# Patient Record
Sex: FEMALE | Race: WHITE | NOT HISPANIC OR LATINO | Employment: OTHER | URBAN - METROPOLITAN AREA
[De-identification: names, ages, dates, MRNs, and addresses within clinical notes are randomized per-mention and may not be internally consistent; named-entity substitution may affect disease eponyms.]

---

## 2019-01-09 ENCOUNTER — OFFICE VISIT (OUTPATIENT)
Dept: URGENT CARE | Facility: CLINIC | Age: 59
End: 2019-01-09
Payer: COMMERCIAL

## 2019-01-09 ENCOUNTER — HOSPITAL ENCOUNTER (INPATIENT)
Facility: OTHER | Age: 59
LOS: 1 days | Discharge: HOME | DRG: 066 | End: 2019-01-10
Attending: EMERGENCY MEDICINE | Admitting: HOSPITALIST
Payer: COMMERCIAL

## 2019-01-09 VITALS
BODY MASS INDEX: 31.01 KG/M2 | RESPIRATION RATE: 18 BRPM | HEIGHT: 63 IN | DIASTOLIC BLOOD PRESSURE: 86 MMHG | WEIGHT: 175 LBS | TEMPERATURE: 98 F | SYSTOLIC BLOOD PRESSURE: 127 MMHG | OXYGEN SATURATION: 99 % | HEART RATE: 67 BPM

## 2019-01-09 DIAGNOSIS — R42 DIZZINESS: Primary | ICD-10-CM

## 2019-01-09 DIAGNOSIS — M62.81 RIGHT-SIDED MUSCLE WEAKNESS: ICD-10-CM

## 2019-01-09 DIAGNOSIS — I63.512 ACUTE ISCHEMIC CEREBROVASCULAR ACCIDENT (CVA) INVOLVING LEFT MIDDLE CEREBRAL ARTERY TERRITORY: Primary | ICD-10-CM

## 2019-01-09 DIAGNOSIS — I63.9 STROKE: ICD-10-CM

## 2019-01-09 DIAGNOSIS — R47.81 SLURRED SPEECH: ICD-10-CM

## 2019-01-09 PROBLEM — I10 ESSENTIAL HYPERTENSION: Status: ACTIVE | Noted: 2019-01-09

## 2019-01-09 LAB
ALBUMIN SERPL BCP-MCNC: 3.9 G/DL
ALP SERPL-CCNC: 67 U/L
ALT SERPL W/O P-5'-P-CCNC: 18 U/L
AMPHET+METHAMPHET UR QL: NEGATIVE
ANION GAP SERPL CALC-SCNC: 10 MMOL/L
AORTIC ROOT ANNULUS: 3.03 CM
AORTIC VALVE CUSP SEPERATION: 1.99 CM
ASCENDING AORTA: 2.94 CM
AST SERPL-CCNC: 21 U/L
AV INDEX (PROSTH): 0.67
AV MEAN GRADIENT: 2.65 MMHG
AV PEAK GRADIENT: 5.57 MMHG
AV VALVE AREA: 2.14 CM2
BARBITURATES UR QL SCN>200 NG/ML: NEGATIVE
BASOPHILS # BLD AUTO: 0.04 K/UL
BASOPHILS NFR BLD: 0.7 %
BENZODIAZ UR QL SCN>200 NG/ML: NEGATIVE
BILIRUB SERPL-MCNC: 0.6 MG/DL
BILIRUB UR QL STRIP: NEGATIVE
BSA FOR ECHO PROCEDURE: 1.95 M2
BUN SERPL-MCNC: 11 MG/DL
BZE UR QL SCN: NEGATIVE
CALCIUM SERPL-MCNC: 10.5 MG/DL
CANNABINOIDS UR QL SCN: NEGATIVE
CHLORIDE SERPL-SCNC: 108 MMOL/L
CHOLEST SERPL-MCNC: 219 MG/DL
CHOLEST/HDLC SERPL: 2.6 {RATIO}
CLARITY UR: CLEAR
CO2 SERPL-SCNC: 22 MMOL/L
COLOR UR: YELLOW
CREAT SERPL-MCNC: 0.8 MG/DL
CREAT UR-MCNC: 17.5 MG/DL
CV ECHO LV RWT: 0.4 CM
DIFFERENTIAL METHOD: ABNORMAL
DOP CALC AO PEAK VEL: 1.18 M/S
DOP CALC AO VTI: 28.81 CM
DOP CALC LVOT AREA: 3.2 CM2
DOP CALC LVOT DIAMETER: 2.02 CM
DOP CALC LVOT STROKE VOLUME: 61.53 CM3
DOP CALCLVOT PEAK VEL VTI: 19.21 CM
E WAVE DECELERATION TIME: 244.63 MSEC
E/A RATIO: 0.9
E/E' RATIO: 8.95
ECHO LV POSTERIOR WALL: 0.87 CM (ref 0.6–1.1)
EOSINOPHIL # BLD AUTO: 0.2 K/UL
EOSINOPHIL NFR BLD: 2.9 %
ERYTHROCYTE [DISTWIDTH] IN BLOOD BY AUTOMATED COUNT: 13.3 %
EST. GFR  (AFRICAN AMERICAN): >60 ML/MIN/1.73 M^2
EST. GFR  (NON AFRICAN AMERICAN): >60 ML/MIN/1.73 M^2
ESTIMATED AVG GLUCOSE: 111 MG/DL
ETHANOL UR-MCNC: <10 MG/DL
FRACTIONAL SHORTENING: 38 % (ref 28–44)
GLUCOSE SERPL-MCNC: 100 MG/DL
GLUCOSE UR QL STRIP: NEGATIVE
HBA1C MFR BLD HPLC: 5.5 %
HCT VFR BLD AUTO: 38.2 %
HDLC SERPL-MCNC: 84 MG/DL
HDLC SERPL: 38.4 %
HGB BLD-MCNC: 13.2 G/DL
HGB UR QL STRIP: NEGATIVE
INR PPP: 0.9
INTERVENTRICULAR SEPTUM: 0.87 CM (ref 0.6–1.1)
IVRT: 0.13 MSEC
KETONES UR QL STRIP: NEGATIVE
LA MAJOR: 5.02 CM
LA MINOR: 5.36 CM
LA WIDTH: 3.65 CM
LDLC SERPL CALC-MCNC: 114.6 MG/DL
LEFT ATRIUM SIZE: 3.42 CM
LEFT ATRIUM VOLUME INDEX: 29.2 ML/M2
LEFT ATRIUM VOLUME: 55.01 CM3
LEFT INTERNAL DIMENSION IN SYSTOLE: 2.67 CM (ref 2.1–4)
LEFT VENTRICLE DIASTOLIC VOLUME INDEX: 45.16 ML/M2
LEFT VENTRICLE DIASTOLIC VOLUME: 85.07 ML
LEFT VENTRICLE MASS INDEX: 63.5 G/M2
LEFT VENTRICLE SYSTOLIC VOLUME INDEX: 14 ML/M2
LEFT VENTRICLE SYSTOLIC VOLUME: 26.37 ML
LEFT VENTRICULAR INTERNAL DIMENSION IN DIASTOLE: 4.34 CM (ref 3.5–6)
LEFT VENTRICULAR MASS: 119.58 G
LEUKOCYTE ESTERASE UR QL STRIP: NEGATIVE
LV LATERAL E/E' RATIO: 7.73
LV SEPTAL E/E' RATIO: 10.63
LYMPHOCYTES # BLD AUTO: 1.9 K/UL
LYMPHOCYTES NFR BLD: 32 %
MCH RBC QN AUTO: 31.4 PG
MCHC RBC AUTO-ENTMCNC: 34.6 G/DL
MCV RBC AUTO: 91 FL
METHADONE UR QL SCN>300 NG/ML: NEGATIVE
MONOCYTES # BLD AUTO: 0.5 K/UL
MONOCYTES NFR BLD: 8.2 %
MV PEAK A VEL: 0.94 M/S
MV PEAK E VEL: 0.85 M/S
NEUTROPHILS # BLD AUTO: 3.3 K/UL
NEUTROPHILS NFR BLD: 56 %
NITRITE UR QL STRIP: NEGATIVE
NONHDLC SERPL-MCNC: 135 MG/DL
OPIATES UR QL SCN: NEGATIVE
PCP UR QL SCN>25 NG/ML: NEGATIVE
PH UR STRIP: 8 [PH] (ref 5–8)
PISA TR MAX VEL: 2.36 M/S
PLATELET # BLD AUTO: 265 K/UL
PMV BLD AUTO: 10.1 FL
POCT GLUCOSE: 95 MG/DL (ref 70–110)
POTASSIUM SERPL-SCNC: 3.9 MMOL/L
PROT SERPL-MCNC: 6.9 G/DL
PROT UR QL STRIP: NEGATIVE
PROTHROMBIN TIME: 9.9 SEC
PULM VEIN S/D RATIO: 1.83
PV PEAK D VEL: 0.3 M/S
PV PEAK S VEL: 0.55 M/S
PV PEAK VELOCITY: 0.91 CM/S
RA MAJOR: 4.63 CM
RA WIDTH: 3.3 CM
RBC # BLD AUTO: 4.21 M/UL
RIGHT VENTRICULAR END-DIASTOLIC DIMENSION: 3.48 CM
RV TISSUE DOPPLER FREE WALL SYSTOLIC VELOCITY 1 (APICAL 4 CHAMBER VIEW): 12.67 M/S
SINUS: 2.93 CM
SODIUM SERPL-SCNC: 140 MMOL/L
SP GR UR STRIP: 1.01 (ref 1–1.03)
STJ: 2.96 CM
TDI LATERAL: 0.11
TDI SEPTAL: 0.08
TDI: 0.1
TOXICOLOGY INFORMATION: NORMAL
TR MAX PG: 22.28 MMHG
TRICUSPID ANNULAR PLANE SYSTOLIC EXCURSION: 2.23 CM
TRIGL SERPL-MCNC: 102 MG/DL
TROPONIN I SERPL DL<=0.01 NG/ML-MCNC: 0.01 NG/ML
TSH SERPL DL<=0.005 MIU/L-ACNC: 0.95 UIU/ML
URN SPEC COLLECT METH UR: NORMAL
UROBILINOGEN UR STRIP-ACNC: NEGATIVE EU/DL
WBC # BLD AUTO: 5.88 K/UL

## 2019-01-09 PROCEDURE — 94761 N-INVAS EAR/PLS OXIMETRY MLT: CPT

## 2019-01-09 PROCEDURE — 99223 PR INITIAL HOSPITAL CARE,LEVL III: ICD-10-PCS | Mod: ,,, | Performed by: PSYCHIATRY & NEUROLOGY

## 2019-01-09 PROCEDURE — 99205 OFFICE O/P NEW HI 60 MIN: CPT | Mod: S$GLB,,, | Performed by: NURSE PRACTITIONER

## 2019-01-09 PROCEDURE — 83036 HEMOGLOBIN GLYCOSYLATED A1C: CPT

## 2019-01-09 PROCEDURE — 84484 ASSAY OF TROPONIN QUANT: CPT

## 2019-01-09 PROCEDURE — 99205 PR OFFICE/OUTPT VISIT, NEW, LEVL V, 60-74 MIN: ICD-10-PCS | Mod: S$GLB,,, | Performed by: NURSE PRACTITIONER

## 2019-01-09 PROCEDURE — 81003 URINALYSIS AUTO W/O SCOPE: CPT

## 2019-01-09 PROCEDURE — 84443 ASSAY THYROID STIM HORMONE: CPT

## 2019-01-09 PROCEDURE — 3008F PR BODY MASS INDEX (BMI) DOCUMENTED: ICD-10-PCS | Mod: CPTII,S$GLB,, | Performed by: NURSE PRACTITIONER

## 2019-01-09 PROCEDURE — 25000003 PHARM REV CODE 250: Performed by: EMERGENCY MEDICINE

## 2019-01-09 PROCEDURE — A4216 STERILE WATER/SALINE, 10 ML: HCPCS | Performed by: HOSPITALIST

## 2019-01-09 PROCEDURE — A9585 GADOBUTROL INJECTION: HCPCS | Performed by: HOSPITALIST

## 2019-01-09 PROCEDURE — 25000003 PHARM REV CODE 250: Performed by: HOSPITALIST

## 2019-01-09 PROCEDURE — 93010 EKG 12-LEAD: ICD-10-PCS | Mod: ,,, | Performed by: INTERNAL MEDICINE

## 2019-01-09 PROCEDURE — 99223 PR INITIAL HOSPITAL CARE,LEVL III: ICD-10-PCS | Mod: ,,, | Performed by: HOSPITALIST

## 2019-01-09 PROCEDURE — 93005 ELECTROCARDIOGRAM TRACING: CPT

## 2019-01-09 PROCEDURE — 3008F BODY MASS INDEX DOCD: CPT | Mod: CPTII,S$GLB,, | Performed by: NURSE PRACTITIONER

## 2019-01-09 PROCEDURE — 25500020 PHARM REV CODE 255: Performed by: HOSPITALIST

## 2019-01-09 PROCEDURE — 80053 COMPREHEN METABOLIC PANEL: CPT

## 2019-01-09 PROCEDURE — 99285 EMERGENCY DEPT VISIT HI MDM: CPT | Mod: 25

## 2019-01-09 PROCEDURE — 80307 DRUG TEST PRSMV CHEM ANLYZR: CPT

## 2019-01-09 PROCEDURE — 63600175 PHARM REV CODE 636 W HCPCS: Performed by: HOSPITALIST

## 2019-01-09 PROCEDURE — 82962 GLUCOSE BLOOD TEST: CPT

## 2019-01-09 PROCEDURE — 11000001 HC ACUTE MED/SURG PRIVATE ROOM

## 2019-01-09 PROCEDURE — 99223 1ST HOSP IP/OBS HIGH 75: CPT | Mod: ,,, | Performed by: HOSPITALIST

## 2019-01-09 PROCEDURE — 92610 EVALUATE SWALLOWING FUNCTION: CPT

## 2019-01-09 PROCEDURE — 85610 PROTHROMBIN TIME: CPT

## 2019-01-09 PROCEDURE — 80061 LIPID PANEL: CPT

## 2019-01-09 PROCEDURE — 99223 1ST HOSP IP/OBS HIGH 75: CPT | Mod: ,,, | Performed by: PSYCHIATRY & NEUROLOGY

## 2019-01-09 PROCEDURE — 85025 COMPLETE CBC W/AUTO DIFF WBC: CPT

## 2019-01-09 PROCEDURE — 93010 ELECTROCARDIOGRAM REPORT: CPT | Mod: ,,, | Performed by: INTERNAL MEDICINE

## 2019-01-09 RX ORDER — ATORVASTATIN CALCIUM 20 MG/1
40 TABLET, FILM COATED ORAL ONCE
Status: COMPLETED | OUTPATIENT
Start: 2019-01-09 | End: 2019-01-09

## 2019-01-09 RX ORDER — AMLODIPINE BESYLATE 2.5 MG/1
TABLET ORAL
Status: ON HOLD | COMMUNITY
Start: 2018-12-17 | End: 2019-01-10 | Stop reason: SDUPTHER

## 2019-01-09 RX ORDER — IRBESARTAN 300 MG/1
300 TABLET ORAL DAILY
COMMUNITY
Start: 2018-12-17

## 2019-01-09 RX ORDER — LORAZEPAM 0.5 MG/1
0.5 TABLET ORAL ONCE
Status: DISCONTINUED | OUTPATIENT
Start: 2019-01-09 | End: 2019-01-10 | Stop reason: HOSPADM

## 2019-01-09 RX ORDER — SODIUM CHLORIDE 0.9 % (FLUSH) 0.9 %
5 SYRINGE (ML) INJECTION
Status: DISCONTINUED | OUTPATIENT
Start: 2019-01-09 | End: 2019-01-10 | Stop reason: HOSPADM

## 2019-01-09 RX ORDER — ASPIRIN 81 MG/1
81 TABLET ORAL DAILY
Status: DISCONTINUED | OUTPATIENT
Start: 2019-01-10 | End: 2019-01-10 | Stop reason: HOSPADM

## 2019-01-09 RX ORDER — LABETALOL HCL 20 MG/4 ML
10 SYRINGE (ML) INTRAVENOUS
Status: DISCONTINUED | OUTPATIENT
Start: 2019-01-09 | End: 2019-01-10 | Stop reason: HOSPADM

## 2019-01-09 RX ORDER — ASPIRIN 325 MG
325 TABLET ORAL ONCE
Status: COMPLETED | OUTPATIENT
Start: 2019-01-09 | End: 2019-01-09

## 2019-01-09 RX ORDER — SODIUM CHLORIDE 0.9 % (FLUSH) 0.9 %
3 SYRINGE (ML) INJECTION EVERY 8 HOURS
Status: DISCONTINUED | OUTPATIENT
Start: 2019-01-09 | End: 2019-01-10 | Stop reason: HOSPADM

## 2019-01-09 RX ORDER — HYDROCHLOROTHIAZIDE 25 MG/1
25 TABLET ORAL DAILY
Status: ON HOLD | COMMUNITY
Start: 2018-12-17 | End: 2019-01-10 | Stop reason: HOSPADM

## 2019-01-09 RX ORDER — ENOXAPARIN SODIUM 100 MG/ML
40 INJECTION SUBCUTANEOUS EVERY 24 HOURS
Status: DISCONTINUED | OUTPATIENT
Start: 2019-01-09 | End: 2019-01-10 | Stop reason: HOSPADM

## 2019-01-09 RX ORDER — IRBESARTAN AND HYDROCHLOROTHIAZIDE 150; 12.5 MG/1; MG/1
TABLET, FILM COATED ORAL
Status: ON HOLD | COMMUNITY
Start: 2018-12-05 | End: 2019-01-10 | Stop reason: SDUPTHER

## 2019-01-09 RX ORDER — GADOBUTROL 604.72 MG/ML
10 INJECTION INTRAVENOUS
Status: COMPLETED | OUTPATIENT
Start: 2019-01-09 | End: 2019-01-09

## 2019-01-09 RX ORDER — ONDANSETRON 8 MG/1
8 TABLET, ORALLY DISINTEGRATING ORAL EVERY 8 HOURS PRN
Status: DISCONTINUED | OUTPATIENT
Start: 2019-01-09 | End: 2019-01-10 | Stop reason: HOSPADM

## 2019-01-09 RX ORDER — ATORVASTATIN CALCIUM 20 MG/1
40 TABLET, FILM COATED ORAL DAILY
Status: DISCONTINUED | OUTPATIENT
Start: 2019-01-10 | End: 2019-01-10 | Stop reason: HOSPADM

## 2019-01-09 RX ADMIN — ATORVASTATIN CALCIUM 40 MG: 20 TABLET, FILM COATED ORAL at 12:01

## 2019-01-09 RX ADMIN — ENOXAPARIN SODIUM 40 MG: 100 INJECTION SUBCUTANEOUS at 05:01

## 2019-01-09 RX ADMIN — Medication 3 ML: at 08:01

## 2019-01-09 RX ADMIN — ASPIRIN 325 MG ORAL TABLET 325 MG: 325 PILL ORAL at 12:01

## 2019-01-09 RX ADMIN — GADOBUTROL 10 ML: 604.72 INJECTION INTRAVENOUS at 02:01

## 2019-01-09 NOTE — PT/OT/SLP PROGRESS
Physical Therapy      Patient Name:  Seble Puente   MRN:  [id number]    Patient not seen today secondary to Unavailable. Pt off the floor at 1339, at MRI at 1430, and at electrocardiogram at 1545. Will follow-up tomorrow, 1/10.    Elizabeth Dwyer, PT

## 2019-01-09 NOTE — HPI
This is a 58-year-old female who was referred for evaluation of speech difficult.  In addition, she also had difficulty ambulating leaning to the right.  Symptoms started the night prior when she felt nauseous and had diarrhea relating it to some bad food that she may have had.  Subsequently her partner noted that she was slurring her speech and started to speak [place] citizen.  Subsequently today she was seen at the urgent care and was referred immediately to the ED for evaluation of a stroke syndrome.  She did report that her symptoms appear to be slowly improving and she denies any headaches.  She had some blurring of vision noted yesterday but this has gradually resolved.  A CT scan of the head done in the emergency room was unremarkable.  She subsequently underwent an MRI scan of the brain and an MRA of the brain and neck done.  This showed evidence of an acute lacunar infarct involving the left ventral thalamus.  No hemorrhage or mass effect.  No significant vascular abnormality.  Remote lacunar infarct involving the right ventral thalamus and suggestion of remote punctate infarcts in the cerebellar hemispheres.  Mild findings of chronic microvascular ischemic disease.

## 2019-01-09 NOTE — SUBJECTIVE & OBJECTIVE
Past Medical History:   Diagnosis Date    Hypertension        Past Surgical History:   Procedure Laterality Date    HYSTERECTOMY      TONSILLECTOMY         Review of patient's allergies indicates:  No Known Allergies    Current Neurological Medications: aspirin    No current facility-administered medications on file prior to encounter.      Current Outpatient Medications on File Prior to Encounter   Medication Sig    amLODIPine (NORVASC) 2.5 MG tablet     hydroCHLOROthiazide (HYDRODIURIL) 25 MG tablet Take 25 mg by mouth once daily.    irbesartan (AVAPRO) 300 MG tablet Take 300 mg by mouth once daily.    irbesartan-hydrochlorothiazide (AVALIDE) 150-12.5 mg per tablet      Family History     Problem Relation (Age of Onset)    Stroke Father        Tobacco Use    Smoking status: Never Smoker    Smokeless tobacco: Never Used   Substance and Sexual Activity    Alcohol use: Yes    Drug use: No    Sexual activity: Not on file     Review of Systems   Constitutional: Negative for activity change, chills, diaphoresis and fatigue.   HENT: Negative for congestion, drooling and hearing loss.    Eyes: Negative for discharge.   Respiratory: Negative for apnea, chest tightness, shortness of breath and wheezing.    Cardiovascular: Negative for palpitations and leg swelling.   Gastrointestinal: Negative for abdominal distention, abdominal pain, constipation and diarrhea.   Musculoskeletal: Positive for gait problem. Negative for arthralgias.   Skin: Negative for rash.   Neurological: Positive for speech difficulty and weakness. Negative for seizures, light-headedness and numbness.   Hematological: Negative for adenopathy.   Psychiatric/Behavioral: Negative for agitation and behavioral problems. The patient is nervous/anxious.      Objective:     Vital Signs (Most Recent):  Temp: 97.8 °F (36.6 °C) (01/09/19 1648)  Pulse: (!) 58 (01/09/19 1648)  Resp: (!) 22 (01/09/19 1648)  BP: (!) 139/92 (01/09/19 1648)  SpO2: 98 %  (01/09/19 1648) Vital Signs (24h Range):  Temp:  [97.5 °F (36.4 °C)-97.8 °F (36.6 °C)] 97.8 °F (36.6 °C)  Pulse:  [56-67] 58  Resp:  [16-22] 22  SpO2:  [95 %-99 %] 98 %  BP: (122-174)/(77-92) 139/92     Weight: 85.6 kg (188 lb 11.4 oz)  Body mass index is 34.52 kg/m².    Physical Exam   Constitutional: She appears well-developed and well-nourished.   HENT:   Head: Normocephalic and atraumatic.   Right Ear: Hearing normal.   Left Ear: Hearing normal.   Eyes: EOM are normal. Pupils are equal, round, and reactive to light.   Fundus examination showed sharp disc margins.   Neck: Normal range of motion. Neck supple. Carotid bruit is not present.   Cardiovascular: Normal rate, regular rhythm and normal heart sounds.   Neurological: She is alert. She has normal reflexes. She displays atrophy (Minimal clumsiness of the right upper extremity). No cranial nerve deficit (Visual fields at bedside testing essentially normal.  No facial asymmetry noted with facial movements and sensory exam being normal/symmetrical.  Corneals/gag reflexes normal.  Tongue & palate movements normal.  Shoulder shrug was normal.) or sensory deficit (Primary sensations are intact). She exhibits normal muscle tone. Coordination and gait (Patient is able to ambulate without assistance..  Minimal difficulty on the right) normal. She displays no Babinski's sign on the right side. She displays no Babinski's sign on the left side.   Mental status examination: Patient is fully oriented and able to give an adequate history.  Recall of recent and past information is good.  Immediate recall is normal.  A back space Judgment and insight is normal.  Language functions are intact with no evidence of aphasia or dysarthria except for minimal hesitancy.  Comprehension is unimpaired.  Affect is appropriate, mood was even.  No thought disorder is noted.   Vitals reviewed.      NEUROLOGICAL EXAMINATION:     CRANIAL NERVES     CN III, IV, VI   Pupils are equal, round,  and reactive to light.  Extraocular motions are normal.       Significant Labs: All pertinent lab results from the past 24 hours have been reviewed.    Significant Imaging: I have reviewed all pertinent imaging results/findings within the past 24 hours.

## 2019-01-09 NOTE — PROGRESS NOTES
"Subjective:       Patient ID: Seble Puente is a 58 y.o. female.    Vitals:  height is 5' 3" (1.6 m) and weight is 79.4 kg (175 lb). Her temperature is 97.8 °F (36.6 °C). Her blood pressure is 127/86 and her pulse is 67. Her respiration is 18 and oxygen saturation is 99%.     Chief Complaint: Blurred Vision; Dizziness; and Neck Pain    Pt from [facility], here on vacation, after eating yesterday about an hour later felt unusual. Began having lightheadedness/dizziness, vision change double, uncoordinated, neck pain, and speech difficulty. Pt takes hypertensive medication but unsure of the name. Pt is currently seeing her dr back home for neck pain and has a CT scheduled for Monday. Pt has never felt like this before. Father has hx of stroke and is diseased from it. Also says she is having a hard time with remembering parts of yesterday. Denies chest pain or rapid heart rate. No diarrhea, vomiting, numbness, or headache.      Dizziness:   Chronicity:  New  Onset:  Yesterday  Progression since onset:  Unchanged  Frequency:  Constantly  Dizziness characteristics:  Off-balance, walking on uneven surface and lightheaded/impending faint   Associated symptoms: light-headedness.no hearing loss, no fever, no headaches, no nausea, no vomiting, no weakness, no palpitations, no facial weakness, no numbness in extremities and no chest pain.  Neck Pain    Pertinent negatives include no chest pain, fever, headaches, numbness or weakness.       Constitution: Positive for generalized weakness. Negative for chills, fatigue and fever.   HENT: Negative for hearing loss, congestion and sore throat.    Neck: Positive for neck pain. Negative for painful lymph nodes.   Cardiovascular: Negative for chest pain, leg swelling and palpitations.   Eyes: Positive for double vision and blurred vision.   Respiratory: Negative for cough and shortness of breath.    Gastrointestinal: Negative for nausea, vomiting, constipation and diarrhea. "   Genitourinary: Negative for dysuria, frequency, urgency and history of kidney stones.   Musculoskeletal: Negative for joint pain, joint swelling, muscle cramps and muscle ache.   Skin: Negative for color change, pale, rash and bruising.   Allergic/Immunologic: Negative for seasonal allergies.   Neurological: Positive for dizziness, light-headedness, speech difficulty, coordination disturbances and disorientation. Negative for history of vertigo, passing out, facial drooping, headaches, numbness and tingling.   Hematologic/Lymphatic: Negative for swollen lymph nodes.   Psychiatric/Behavioral: Positive for disorientation. Negative for nervous/anxious, sleep disturbance and depression. The patient is not nervous/anxious.        Objective:      Physical Exam   Constitutional: She is oriented to person, place, and time. She appears well-developed and well-nourished. She is cooperative.  Non-toxic appearance. She does not appear ill. No distress.   HENT:   Head: Normocephalic and atraumatic. Head is without abrasion, without contusion and without laceration.   Right Ear: Hearing, tympanic membrane, external ear and ear canal normal. No hemotympanum.   Left Ear: Hearing, tympanic membrane, external ear and ear canal normal. No hemotympanum.   Nose: Nose normal. No mucosal edema, rhinorrhea or nasal deformity. No epistaxis. Right sinus exhibits no maxillary sinus tenderness and no frontal sinus tenderness. Left sinus exhibits no maxillary sinus tenderness and no frontal sinus tenderness.   Mouth/Throat: Uvula is midline, oropharynx is clear and moist and mucous membranes are normal. No trismus in the jaw. Normal dentition. No uvula swelling. No posterior oropharyngeal erythema.   Eyes: Conjunctivae, EOM and lids are normal. Pupils are equal, round, and reactive to light. Right eye exhibits no discharge. Left eye exhibits no discharge. No scleral icterus.   Sclera clear bilat   Neck: Trachea normal, normal range of motion,  full passive range of motion without pain and phonation normal. Neck supple. No spinous process tenderness and no muscular tenderness present. No neck rigidity. No tracheal deviation present.   Cardiovascular: Normal rate, regular rhythm, normal heart sounds, intact distal pulses and normal pulses.   Pulmonary/Chest: Effort normal and breath sounds normal. No respiratory distress.   Abdominal: Soft. Normal appearance and bowel sounds are normal. She exhibits no distension, no pulsatile midline mass and no mass. There is no tenderness.   Musculoskeletal: Normal range of motion. She exhibits no edema or deformity.   Neurological: She is alert and oriented to person, place, and time. No cranial nerve deficit or sensory deficit. She exhibits normal muscle tone. She displays no seizure activity. Coordination and gait normal. GCS eye subscore is 4. GCS verbal subscore is 4. GCS motor subscore is 6.   Right side motor weakness   Skin: Skin is warm, dry and intact. Capillary refill takes less than 2 seconds. No abrasion, no bruising, no burn, no ecchymosis and no laceration noted. She is not diaphoretic. No pallor.   Psychiatric: She has a normal mood and affect. Her speech is normal and behavior is normal. Judgment and thought content normal. Cognition and memory are normal.   Nursing note and vitals reviewed.      Assessment:       1. Dizziness    2. Right-sided muscle weakness    3. Slurred speech        Plan:         Dizziness  -     Refer to Emergency Dept.    Right-sided muscle weakness    Slurred speech

## 2019-01-09 NOTE — PROGRESS NOTES
Pt is a 58 y female c/o dizziness, slurred speech, blurred vision, and right side weakness that started last night. Pt states symptoms have slightly improved, however she is very tired, anxious, and is still having right side weakness with slurred speech. Pt also states she can not remember the events of yesterday and can not remember her BP medication. Pt pmh is positive for HTN, she states that it is always high. Pt has pos family history for father who had a stroke. Pt is from [facility] and English is her second language, however pt states her speech is completely abnormal. Pt is accompanied with partner who is in the waiting room. Upon examination pt has normal gait, AAOX3, ambulating without difficulty pt VSS normal. Neuro exam positive for slurred speech, and right side weakness noted with hand grasp and shoulder shrug. No deficits noted with facial expressions or tongue deviation. See assessment note for further documentation. Based on clinical examination pt is advised to report to ER for further evaluation to r/o CVA hemorrhagic vs thrombosis. Pt refused EMS transportation and ask that her partner, Thaddeus be informed and drive her there. All risk explained to pt. Pt verbalized understanding and partner agreed to bring her to ER immediately. Pt given addresses to all nearby ER by staff. Pt is ambulating without difficulty, no signs of distress and independently left clinic with Thaddeus.

## 2019-01-09 NOTE — ASSESSMENT & PLAN NOTE
The patient's clinical presentation is consistent with an acute ischemic infarct primary resulting in right-sided clumsiness and speech difficulty.  Control vascular risk factors specially hypertension is important.  She would need to be on a statin.  Await results of the echocardiogram.  Continue anti-platelet agents/aspirin.    Recommendations:  Discussed with Dr. Holland.  She has shown significant improvement since onset of symptoms.  If the echocardiogram is normal she may be discharged home tomorrow morning on her present medications including the statin and aspirin.  She is advised that she should not be driving and needs to be re-evaluated by her primary care physician in [place] after she is back, before she can drive again.  Discussed this with her partner who agrees with this plan.

## 2019-01-09 NOTE — ED NOTES
Transport arrived for pt. Pt ambulatory to , AAOx4, calm, cooperative, responding appropriately to questions, speaking in full sentences, respirations even and unlabored, skin warm and dry. Pt accepting of stay in hospital after encouragement of RN, ED MD, Dr. Holland and pt's wife at bedside. MRI checklist signed by pt and witnessed by RN sent with pt to MRI via transport staff.

## 2019-01-09 NOTE — ED NOTES
Pt's wife called their pharmacy and confirmed pt's medications. Pt states she took all her medications this morning.

## 2019-01-09 NOTE — ED NOTES
Pt updated on POC. Pt adamant that she does not want to stay in the hospital. Pt states she is amenable to having the MRI scan, but states she is fine and does not want to stay in the hospital. Pt states blurry vision has resolved and she feels like she is talking normally now. Will notify MD.

## 2019-01-09 NOTE — HOSPITAL COURSE
Since admission the patient is neurological symptoms have been gradually improving.  Reviewed results of the MRI scans and MRA of the head and neck.  On further questioning she does report having had a transient episode of speech difficulty about 3 years ago which resolved.  Vascular risk factors include hypertension.

## 2019-01-09 NOTE — SUBJECTIVE & OBJECTIVE
Past Medical History:   Diagnosis Date    Hypertension          TIA 4 years ago    Past Surgical History:   Procedure Laterality Date    HYSTERECTOMY      TONSILLECTOMY         Appendectomy    Review of patient's allergies indicates:  No Known Allergies    No current facility-administered medications on file prior to encounter.      Current Outpatient Medications on File Prior to Encounter   Medication Sig    amLODIPine (NORVASC) 2.5 MG tablet     hydroCHLOROthiazide (HYDRODIURIL) 25 MG tablet Take 25 mg by mouth once daily.    irbesartan (AVAPRO) 300 MG tablet Take 300 mg by mouth once daily.    irbesartan-hydrochlorothiazide (AVALIDE) 150-12.5 mg per tablet      Family History     Problem Relation (Age of Onset)    Stroke Father        Tobacco Use    Smoking status: Quit smoking about 4 years ago.    Smokeless tobacco: Never Used   Substance and Sexual Activity    Alcohol use: Yes, one glass of wine daily    Drug use: No    Sexual activity: Not on file     Review of Systems   Constitutional: Negative for activity change, chills, diaphoresis and fatigue.   HENT: Negative for congestion, drooling and hearing loss.    Eyes: Negative for discharge.   Respiratory: Negative for apnea, chest tightness, shortness of breath and wheezing.    Cardiovascular: Negative for palpitations and leg swelling.   Gastrointestinal: Negative for abdominal distention, abdominal pain, constipation and diarrhea.   Musculoskeletal: Negative for arthralgias and gait problem.   Skin: Negative for rash.   Neurological: Positive for speech difficulty. Negative for seizures, light-headedness and numbness.   Hematological: Negative for adenopathy.   Psychiatric/Behavioral: Negative for agitation and behavioral problems. The patient is nervous/anxious.      Objective:     Vital Signs (Most Recent):  Temp: 97.5 °F (36.4 °C) (01/09/19 1050)  Pulse: 60 (01/09/19 1346)  Resp: 16 (01/09/19 1346)  BP: 122/77 (01/09/19 1342)  SpO2: 96 %  (01/09/19 1346) Vital Signs (24h Range):  Temp:  [97.5 °F (36.4 °C)-97.8 °F (36.6 °C)] 97.5 °F (36.4 °C)  Pulse:  [56-67] 60  Resp:  [16-19] 16  SpO2:  [95 %-99 %] 96 %  BP: (122-174)/(77-86) 122/77     Weight: 85.6 kg (188 lb 11.4 oz)  Body mass index is 34.52 kg/m².    Physical Exam   Constitutional: She is oriented to person, place, and time. She appears well-developed and well-nourished.   HENT:   Head: Normocephalic and atraumatic.   Eyes: EOM are normal. Pupils are equal, round, and reactive to light.   Neck: Normal range of motion. Neck supple.   Cardiovascular: Normal rate, regular rhythm and normal heart sounds.   Pulmonary/Chest: Effort normal and breath sounds normal. No respiratory distress.   Abdominal: Soft. Bowel sounds are normal. She exhibits no distension. There is no tenderness.   Musculoskeletal: Normal range of motion. She exhibits no edema.   Neurological: She is alert and oriented to person, place, and time. Coordination normal.   Mildly dysarthric speech, moves all extremities with no focal deficits, no numbness or tingling.   Skin: Skin is warm and dry.   Psychiatric:   Very anxious and tearful   Vitals reviewed.        CRANIAL NERVES     CN III, IV, VI   Pupils are equal, round, and reactive to light.  Extraocular motions are normal.        Significant Labs:   Blood Culture: No results for input(s): LABBLOO in the last 48 hours.  CBC:   Recent Labs   Lab 01/09/19  1113   WBC 5.88   HGB 13.2   HCT 38.2        CMP:   Recent Labs   Lab 01/09/19  1113      K 3.9      CO2 22*      BUN 11   CREATININE 0.8   CALCIUM 10.5   PROT 6.9   ALBUMIN 3.9   BILITOT 0.6   ALKPHOS 67   AST 21   ALT 18   ANIONGAP 10   EGFRNONAA >60     Coagulation:   Recent Labs   Lab 01/09/19  1113   INR 0.9     Urine Studies:   Recent Labs   Lab 01/09/19  1207   COLORU Yellow   APPEARANCEUA Clear   PHUR 8.0   SPECGRAV 1.010   PROTEINUA Negative   GLUCUA Negative   KETONESU Negative   BILIRUBINUA  Negative   OCCULTUA Negative   NITRITE Negative   UROBILINOGEN Negative   LEUKOCYTESUR Negative       Significant Imaging: I have reviewed and interpreted all pertinent imaging results/findings within the past 24 hours.

## 2019-01-09 NOTE — CONSULTS
[facility]  Neurology  Consult Note    Patient Name: Seble Puente  MRN: [id number]  Admission Date: 1/9/2019  Hospital Length of Stay: 0 days  Code Status: Full Code   Attending Provider: Lewis Holland MD   Consulting Provider: Sabas Sommer MD  Primary Care Physician: Primary Doctor No  Principal Problem:Acute ischemic left MCA stroke    Consults   Subjective:     Chief Complaint:  stroke     HPI:   This is a 58-year-old female who was referred for evaluation of speech difficult.  In addition, she also had difficulty ambulating leaning to the right.  Symptoms started the night prior when she felt nauseous and had diarrhea relating it to some bad food that she may have had.  Subsequently her partner noted that she was slurring her speech and started to speak Vietnamese.  Subsequently today she was seen at the urgent care and was referred immediately to the ED for evaluation of a stroke syndrome.  She did report that her symptoms appear to be slowly improving and she denies any headaches.  She had some blurring of vision noted yesterday but this has gradually resolved.  A CT scan of the head done in the emergency room was unremarkable.  She subsequently underwent an MRI scan of the brain and an MRA of the brain and neck done.  This showed evidence of an acute lacunar infarct involving the left ventral thalamus.  No hemorrhage or mass effect.  No significant vascular abnormality.  Remote lacunar infarct involving the right ventral thalamus and suggestion of remote punctate infarcts in the cerebellar hemispheres.  Mild findings of chronic microvascular ischemic disease.     Past Medical History:   Diagnosis Date    Hypertension        Past Surgical History:   Procedure Laterality Date    HYSTERECTOMY      TONSILLECTOMY         Review of patient's allergies indicates:  No Known Allergies    Current Neurological Medications: aspirin    No current facility-administered medications on file  prior to encounter.      Current Outpatient Medications on File Prior to Encounter   Medication Sig    amLODIPine (NORVASC) 2.5 MG tablet     hydroCHLOROthiazide (HYDRODIURIL) 25 MG tablet Take 25 mg by mouth once daily.    irbesartan (AVAPRO) 300 MG tablet Take 300 mg by mouth once daily.    irbesartan-hydrochlorothiazide (AVALIDE) 150-12.5 mg per tablet      Family History     Problem Relation (Age of Onset)    Stroke Father        Tobacco Use    Smoking status: Never Smoker    Smokeless tobacco: Never Used   Substance and Sexual Activity    Alcohol use: Yes    Drug use: No    Sexual activity: Not on file     Review of Systems   Constitutional: Negative for activity change, chills, diaphoresis and fatigue.   HENT: Negative for congestion, drooling and hearing loss.    Eyes: Negative for discharge.   Respiratory: Negative for apnea, chest tightness, shortness of breath and wheezing.    Cardiovascular: Negative for palpitations and leg swelling.   Gastrointestinal: Negative for abdominal distention, abdominal pain, constipation and diarrhea.   Musculoskeletal: Positive for gait problem. Negative for arthralgias.   Skin: Negative for rash.   Neurological: Positive for speech difficulty and weakness. Negative for seizures, light-headedness and numbness.   Hematological: Negative for adenopathy.   Psychiatric/Behavioral: Negative for agitation and behavioral problems. The patient is nervous/anxious.      Objective:     Vital Signs (Most Recent):  Temp: 97.8 °F (36.6 °C) (01/09/19 1648)  Pulse: (!) 58 (01/09/19 1648)  Resp: (!) 22 (01/09/19 1648)  BP: (!) 139/92 (01/09/19 1648)  SpO2: 98 % (01/09/19 1648) Vital Signs (24h Range):  Temp:  [97.5 °F (36.4 °C)-97.8 °F (36.6 °C)] 97.8 °F (36.6 °C)  Pulse:  [56-67] 58  Resp:  [16-22] 22  SpO2:  [95 %-99 %] 98 %  BP: (122-174)/(77-92) 139/92     Weight: 85.6 kg (188 lb 11.4 oz)  Body mass index is 34.52 kg/m².    Physical Exam   Constitutional: She appears  well-developed and well-nourished.   HENT:   Head: Normocephalic and atraumatic.   Right Ear: Hearing normal.   Left Ear: Hearing normal.   Eyes: EOM are normal. Pupils are equal, round, and reactive to light.   Fundus examination showed sharp disc margins.   Neck: Normal range of motion. Neck supple. Carotid bruit is not present.   Cardiovascular: Normal rate, regular rhythm and normal heart sounds.   Neurological: She is alert. She has normal reflexes. She displays atrophy (Minimal clumsiness of the right upper extremity). No cranial nerve deficit (Visual fields at bedside testing essentially normal.  No facial asymmetry noted with facial movements and sensory exam being normal/symmetrical.  Corneals/gag reflexes normal.  Tongue & palate movements normal.  Shoulder shrug was normal.) or sensory deficit (Primary sensations are intact). She exhibits normal muscle tone. Coordination and gait (Patient is able to ambulate without assistance..  Minimal difficulty on the right) normal. She displays no Babinski's sign on the right side. She displays no Babinski's sign on the left side.   Mental status examination: Patient is fully oriented and able to give an adequate history.  Recall of recent and past information is good.  Immediate recall is normal.  A back space Judgment and insight is normal.  Language functions are intact with no evidence of aphasia or dysarthria except for minimal hesitancy.  Comprehension is unimpaired.  Affect is appropriate, mood was even.  No thought disorder is noted.   Vitals reviewed.      NEUROLOGICAL EXAMINATION:     CRANIAL NERVES     CN III, IV, VI   Pupils are equal, round, and reactive to light.  Extraocular motions are normal.       Significant Labs: All pertinent lab results from the past 24 hours have been reviewed.    Significant Imaging: I have reviewed all pertinent imaging results/findings within the past 24 hours.    Assessment and Plan:     * Acute ischemic left MCA stroke     The patient's clinical presentation is consistent with an acute ischemic infarct primary resulting in right-sided clumsiness and speech difficulty.  Control vascular risk factors specially hypertension is important.  She would need to be on a statin.  Await results of the echocardiogram.  Continue anti-platelet agents/aspirin.    Recommendations:  Discussed with Dr. Holland.  She has shown significant improvement since onset of symptoms.  If the echocardiogram is normal she may be discharged home tomorrow morning on her present medications including the statin and aspirin.  She is advised that she should not be driving and needs to be re-evaluated by her primary care physician in [place] after she is back, before she can drive again.  Discussed this with her partner who agrees with this plan.         VTE Risk Mitigation (From admission, onward)        Ordered     enoxaparin injection 40 mg  Daily      01/09/19 1344     IP VTE HIGH RISK PATIENT  Once      01/09/19 1640     Place RAVIN hose  Until discontinued      01/09/19 1640     Place sequential compression device  Until discontinued      01/09/19 1344          Thank you for your consult. I will sign off. Please contact us if you have any additional questions.    Sabas Sommer MD  Neurology  [facility]

## 2019-01-09 NOTE — H&P
[facility] Hospital Medicine  History & Physical    Patient Name: Seble Puente  MRN: [id number]  Admission Date: 1/9/2019  Attending Physician: Lewis Holland MD  Primary Care Provider: Primary Doctor No         Patient information was obtained from patient, spouse/SO and ER records.     Subjective:     Principal Problem:Acute ischemic left MCA stroke    Chief Complaint:   Chief Complaint   Patient presents with    Aphasia     double vision started 1500 yesterday with aphasia and right sided weakness. Woke up this morning with resolved double vision but continued slurred speech.         HPI: Mrs. Puente is a 58 year old woman with history of HTN and TIA who came in for evaluation of double vision, slurred speech and leaning towards the right.  She states that she and her wife have been on a long road trip from [place], to [place] and are now on their way back to [place].  Yesterday they stopped and ate fish from a fast food restaurant.  Shortly after this, around 3:00PM yesterday, she developed acute onset of severe double vision with slurred speech.  Additionally at that time she was leaning to the right and had difficulty with balance.  Her wife tried to convince her to go the the hospital at that time but she refused.  Overnight her symptoms have improved significantly, with near resolution of her diplopia and balance difficulties, but her speech is still noticeably slurred.  She denies any chest pain, shortness of breath, palpitations, leg swelling, pain, numbness, tingling or headache.  She is very anxious and tearful a times and states she is very scared of needing to spend a night in the hospital.      Past Medical History:   Diagnosis Date    Hypertension          TIA 4 years ago    Past Surgical History:   Procedure Laterality Date    HYSTERECTOMY      TONSILLECTOMY         Appendectomy    Review of patient's allergies indicates:  No Known Allergies    No current  facility-administered medications on file prior to encounter.      Current Outpatient Medications on File Prior to Encounter   Medication Sig    amLODIPine (NORVASC) 2.5 MG tablet     hydroCHLOROthiazide (HYDRODIURIL) 25 MG tablet Take 25 mg by mouth once daily.    irbesartan (AVAPRO) 300 MG tablet Take 300 mg by mouth once daily.    irbesartan-hydrochlorothiazide (AVALIDE) 150-12.5 mg per tablet      Family History     Problem Relation (Age of Onset)    Stroke Father        Tobacco Use    Smoking status: Quit smoking about 4 years ago.    Smokeless tobacco: Never Used   Substance and Sexual Activity    Alcohol use: Yes, one glass of wine daily    Drug use: No    Sexual activity: Not on file     Review of Systems   Constitutional: Negative for activity change, chills, diaphoresis and fatigue.   HENT: Negative for congestion, drooling and hearing loss.    Eyes: Negative for discharge.   Respiratory: Negative for apnea, chest tightness, shortness of breath and wheezing.    Cardiovascular: Negative for palpitations and leg swelling.   Gastrointestinal: Negative for abdominal distention, abdominal pain, constipation and diarrhea.   Musculoskeletal: Negative for arthralgias and gait problem.   Skin: Negative for rash.   Neurological: Positive for speech difficulty. Negative for seizures, light-headedness and numbness.   Hematological: Negative for adenopathy.   Psychiatric/Behavioral: Negative for agitation and behavioral problems. The patient is nervous/anxious.      Objective:     Vital Signs (Most Recent):  Temp: 97.5 °F (36.4 °C) (01/09/19 1050)  Pulse: 60 (01/09/19 1346)  Resp: 16 (01/09/19 1346)  BP: 122/77 (01/09/19 1342)  SpO2: 96 % (01/09/19 1346) Vital Signs (24h Range):  Temp:  [97.5 °F (36.4 °C)-97.8 °F (36.6 °C)] 97.5 °F (36.4 °C)  Pulse:  [56-67] 60  Resp:  [16-19] 16  SpO2:  [95 %-99 %] 96 %  BP: (122-174)/(77-86) 122/77     Weight: 85.6 kg (188 lb 11.4 oz)  Body mass index is 34.52  kg/m².    Physical Exam   Constitutional: She is oriented to person, place, and time. She appears well-developed and well-nourished.   HENT:   Head: Normocephalic and atraumatic.   Eyes: EOM are normal. Pupils are equal, round, and reactive to light.   Neck: Normal range of motion. Neck supple.   Cardiovascular: Normal rate, regular rhythm and normal heart sounds.   Pulmonary/Chest: Effort normal and breath sounds normal. No respiratory distress.   Abdominal: Soft. Bowel sounds are normal. She exhibits no distension. There is no tenderness.   Musculoskeletal: Normal range of motion. She exhibits no edema.   Neurological: She is alert and oriented to person, place, and time. Coordination normal.   Mildly dysarthric speech, moves all extremities with no focal deficits, no numbness or tingling.   Skin: Skin is warm and dry.   Psychiatric:   Very anxious and tearful   Vitals reviewed.        CRANIAL NERVES     CN III, IV, VI   Pupils are equal, round, and reactive to light.  Extraocular motions are normal.        Significant Labs:   Blood Culture: No results for input(s): LABBLOO in the last 48 hours.  CBC:   Recent Labs   Lab 01/09/19  1113   WBC 5.88   HGB 13.2   HCT 38.2        CMP:   Recent Labs   Lab 01/09/19  1113      K 3.9      CO2 22*      BUN 11   CREATININE 0.8   CALCIUM 10.5   PROT 6.9   ALBUMIN 3.9   BILITOT 0.6   ALKPHOS 67   AST 21   ALT 18   ANIONGAP 10   EGFRNONAA >60     Coagulation:   Recent Labs   Lab 01/09/19  1113   INR 0.9     Urine Studies:   Recent Labs   Lab 01/09/19  1207   COLORU Yellow   APPEARANCEUA Clear   PHUR 8.0   SPECGRAV 1.010   PROTEINUA Negative   GLUCUA Negative   KETONESU Negative   BILIRUBINUA Negative   OCCULTUA Negative   NITRITE Negative   UROBILINOGEN Negative   LEUKOCYTESUR Negative       Significant Imaging: I have reviewed and interpreted all pertinent imaging results/findings within the past 24 hours.    Assessment/Plan:     * Acute ischemic left  MCA stroke    -Suspect acute L MCA stroke, but could also be small vessel  causing dysarthria.  Unlikely, but if MRI negative this could be conversion disorder due to her severe anxiety.    -Will admit to inpatient telemetry status  -Allow permissive hypertension for now.  -Has presented outside the window for tPA  -Order MRI brain, MRA bran and neck, Echo with bubble study and lipid panel  -Treat with aspirin and statin  -Monitor on telemetry for any arrhythmias  -Consult neurology, PT, OT and SLP       Essential hypertension    -Hold home medications to allow for permissive hypertension  -Treat with labetalol if SBP > 150         VTE Risk Mitigation (From admission, onward)        Ordered     enoxaparin injection 40 mg  Daily      01/09/19 1344     IP VTE HIGH RISK PATIENT  Once      01/09/19 1344     Place sequential compression device  Until discontinued      01/09/19 1344             Lweis Holland MD  Department of Hospital Medicine   [facility]

## 2019-01-09 NOTE — PT/OT/SLP EVAL
Speech Language Pathology Evaluation  Bedside Swallow    Patient Name:  Seble Puente   MRN:  [id number]  Admitting Diagnosis: Acute ischemic left MCA stroke    Recommendations:                 General Recommendations: SLP to continue to follow for ongoing assessment of diet tolerance, cognitive communication status, and dysarthria s/p CVA  Diet recommendations:  Regular,   thin  Aspiration Precautions: 1 bite/sip at a time, Avoid talking while eating, Double swallow with each bite/sip, Feed only when awake/alert, HOB to 90 degrees, Remain upright 30 minutes post meal and Small bites/sips, no straws   General Precautions: Standard, aspiration    History:     HPI: Mrs. Puente is a 58 year old woman with history of HTN and TIA who came in for evaluation of double vision, slurred speech and leaning towards the right.  She states that she and her wife have been on a long road trip from [place], to [place] and are now on their way back to [place].  Yesterday they stopped and ate fish from a fast food restaurant.  Shortly after this, around 3:00PM yesterday, she developed acute onset of severe double vision with slurred speech.  Additionally at that time she was leaning to the right and had difficulty with balance.  Her wife tried to convince her to go the the hospital at that time but she refused.  Overnight her symptoms have improved significantly, with near resolution of her diplopia and balance difficulties, but her speech is still noticeably slurred.  She denies any chest pain, shortness of breath, palpitations, leg swelling, pain, numbness, tingling or headache.  She is very anxious and tearful a times and states she is very scared of needing to spend a night in the hospital.       MRI 1/9/19: Acute lacunar infarct involving the left ventral thalamus.  No hemorrhage or mass effect.  No significant vascular abnormality.  Remote lacunar infarct involving the right ventral thalamus and suggestion of remote  "punctate infarcts in the cerebellar hemispheres.  Mild findings of chronic microvascular ischemic disease.      Past Medical History:   Diagnosis Date    Hypertension        Past Surgical History:   Procedure Laterality Date    HYSTERECTOMY      TONSILLECTOMY           Subjective     · Pt awake, sitting up in chair with RN and partner at bedside. Agreeable to brief SLP evaluation, although, pt stating she is fine.     Objective:     Cognitive Communication Status: Pt awake, alert, visibly anxious. Hesitant to participate in speech evaluation. Able to state name and brief reason for hospitalization/onset of symptoms. Difficult to engage pt in conversation due to anxiety level. Pt stating "I am fine. I don't want to be here that is all. I am anxious about being here." Further evaluation of cognition deferred at this time.     Oral Musculature Evaluation  · Oral Musculature: WNL  · Dentition: present and adequate  · Secretion Management: adequate  · Mucosal Quality: good  · Mandibular Strength and Mobility: WNL  · Oral Labial Strength and Mobility: WNL  · Lingual Strength and Mobility: WNL  · Buccal Strength and Mobility: WNL   · Face is symmetrical at rest and during smile. Able to protrude, retract, lateralize, and elevate tongue with protrusion at midline. Speech is mildly slurred with pt stating speech is not at baseline. Denies weakness or discomfort, feels her "mouth is just not moving right". Intelligibility ~90% to familiar listener this date. Pt has a [place] accent.     Bedside Swallow Eval:   Consistencies Assessed:  · Thin liquids small and large sips via cup and straw  · Puree 1/2 tsp bite  · Solids one bite of cracker     Oral Phase:   · Lip seal, ability to form cohesive bolus, and a-p transport of liquids, purees, and solids appear to be WNL  · No oral residue noted after the swallow     Pharyngeal Phase:   · Trigger of pharyngeal swallow appears to be WNL for pt's age  · Pt with immediate and delayed " cough after swallow on 2/2 large volume sips of water via cup and straw. Pt with immediate and delayed cough on 2/5 small sips of water via cup.   · Pt stating she is nervous and does not have any difficulty swallowing.   · Pt and spouse stating she has not experienced difficulty swallowing since onset of symptoms.   · No overt s/s of airway threat or aspiration noted during limited intake of purees and solids: no coughing, throat clearing, change in vocal quality.   · Pt insisting she has no trouble swallowing and attributes all coughing due to nerves.     Treatment: Discussed reason for swallow evaluation with pt and family. Pt demonstrating understanding of a stroke possibly causing difficulty swallowing. Recommended to pt she take small sips from a cup only at this time. She demonstrated understanding and she and her partner agreed to alert her nurse if coughing persists during meals. RN present for evaluation and recommendations reviewed.     Assessment:     Seble Puente is a 58 y.o. female diagnosed with right lacunar and cerebellar infarcts. Pt extremely anxious during evaluation and limited status of pt dysarthria and cognition evaluated this date. Pt with coughing on bedside evaluation, however, insisting it is due to anxiety. Discussed recommendations with pt, family, and RN. Further evaluation of swallowing, dysarthria, and cognitive communication status warranted next session.     Goals:   Multidisciplinary Problems     SLP Goals        Problem: SLP Goal    Goal Priority Disciplines Outcome   SLP Goal     SLP Ongoing (interventions implemented as appropriate)   Description:  1. Pt will be able to consume a regular diet with thin liquids with no overt s/s of airway threat or aspiration.   2. Further evaluation of cognitive communication status                    Plan:     · Patient to be seen:  4 x/week, 6 x/week   · Plan of Care expires:  01/18/19  · Plan of Care reviewed with:  patient,  significant other, other (see comments)(RN)   · SLP Follow-Up:  Yes       Discharge recommendations:  other (see comments)(TBD)     Time Tracking:     SLP Treatment Date:   01/09/19  Speech Start Time:  1655  Speech Stop Time:  1710     Speech Total Time (min):  15 min    Billable Minutes: Eval Swallow and Oral Function 15 mins    SUSAN Connor-SLP  01/09/2019

## 2019-01-09 NOTE — ED NOTES
Dr. Muhammad notified of pt status. No CODE STROKE called at this time. Pt currently in CT. No extremity weakness or drift noted. Pt reports double vision symptoms resolved.

## 2019-01-09 NOTE — HPI
Mrs. Puente is a 58 year old woman with history of HTN and TIA who came in for evaluation of double vision, slurred speech and leaning towards the right.  She states that she and her wife have been on a long road trip from [place], to [place] and are now on their way back to [place].  Yesterday they stopped and ate fish from a fast food restaurant.  Shortly after this, around 3:00PM yesterday, she developed acute onset of severe double vision with slurred speech.  Additionally at that time she was leaning to the right and had difficulty with balance.  Her wife tried to convince her to go the the hospital at that time but she refused.  Overnight her symptoms have improved significantly, with near resolution of her diplopia and balance difficulties, but her speech is still noticeably slurred.  She denies any chest pain, shortness of breath, palpitations, leg swelling, pain, numbness, tingling or headache.  She is very anxious and tearful a times and states she is very scared of needing to spend a night in the hospital.

## 2019-01-09 NOTE — PLAN OF CARE
Problem: SLP Goal  Goal: SLP Goal  1. Pt will be able to consume a regular diet with thin liquids with no overt s/s of airway threat or aspiration.   2. Further evaluation of cognitive communication status  Outcome: Ongoing (interventions implemented as appropriate)  Bedside swallow and oral motor evaluation performed this date    Comments: SLP to continue to follow 4-6x/week

## 2019-01-09 NOTE — ED TRIAGE NOTES
Pt presents to ED with c/o double vision, slurred speech and leaning towards the right when walking. Pt reports today vision is better but continued difficulty speaking. Pt denies CP, SOB, fever, chills.    Wife at bedside states she and pt got food poisoning yesterday at 3pm from fish at a fast food restaurant, wife reports pt had nausea but no vomiting or diarrhea then developed double vision and slurred speech, pt refused to go to ED yesterday around 7pm, fell asleep. Wife was waking pt up periodically and speaking Urdu (native language) but was not making sense. Upon waking wife brought pt in. Pt woke u pat 7am with continued slurred dysarthric speech and leaning towards right when walking, denied facial droop and states she had equal arm strength.    Pt semi-Fowlers in stretcher, AAOx4 , anxious, shaky, cooperative, responding appropriately to questions, respirations even and unlabored, skin warm and dry.

## 2019-01-09 NOTE — ASSESSMENT & PLAN NOTE
-Suspect acute L MCA stroke, but could also be small vessel  causing dysarthria.  Unlikely, but if MRI negative this could be conversion disorder due to her severe anxiety.    -Will admit to inpatient telemetry status  -Allow permissive hypertension for now.  -Has presented outside the window for tPA  -Order MRI brain, MRA bran and neck, Echo with bubble study and lipid panel  -Treat with aspirin and statin  -Monitor on telemetry for any arrhythmias  -Consult neurology, PT, OT and SLP

## 2019-01-09 NOTE — ED PROVIDER NOTES
"Encounter Date: 1/9/2019    SCRIBE #1 NOTE: I, Azar South, am scribing for, and in the presence of, Dr. Muhammad .       History     Chief Complaint   Patient presents with    Aphasia     double vision started 1500 yesterday with aphasia and right sided weakness. Woke up this morning with resolved double vision but continued slurred speech.      Time seen by provider: 11:07 AM    This is a 58 y.o. female, with history of HTN, who presents with complaint of speech difficulty that began at 3 PM yesterday. Per patient's wife, patient ate a piece of "bad fish" around 1 PM yesterday and began feel nauseous and experiencing diarrhea shortly after. Per patient's wife, they were in the car when her speech started to slur, and she began experiencing blurry vision. Per wife, "she was speaking in [place], but we don't usually speak [place] to each other and she wasn't making any sense". Patient reportedly could not walk straight and would lean to her right side. Per wife, the patient experienced LOC around 7 PM, would not get up, but was able to say "no" to going to the hospital. Per wife, patient woke up around 7 AM this morning still experiencing speech difficulty. Per wife, she sounded hesitant and she couldn't get her words out clearly. Wife has not noticed a facial droop or unilateral weakness or numbness. Patient agrees that her speech is still affected at this time. She states "it's hard to say what I want to say". She notes blurry vision has resolved. She denies recent falls or head trauma. She reports one episode of aphasia years ago, and states she was evaluated with no acute findings. Per wife, she is scheduled to undergo an MRI of the brain on 1/14. Per wife, patient's father had history of CVA and passed away from a MI. She has not been experiencing fevers, chills, headaches, dizziness, congestion, rhinorrhea, sore throat, cough, SOB, chest pain, rash, abdominal pain, N/V/D, urine color change,  dysuria, urinary " frequency, or urinary urgency.       The history is provided by the patient and the spouse (wife at bedside).     Review of patient's allergies indicates:  No Known Allergies  Past Medical History:   Diagnosis Date    Hypertension      Past Surgical History:   Procedure Laterality Date    HYSTERECTOMY      TONSILLECTOMY       Family History   Problem Relation Age of Onset    Stroke Father      Social History     Tobacco Use    Smoking status: Never Smoker    Smokeless tobacco: Never Used   Substance Use Topics    Alcohol use: Yes    Drug use: No     Review of Systems   Constitutional: Negative for chills and fever.   HENT: Negative for congestion, rhinorrhea and sore throat.    Eyes: Positive for visual disturbance (blurry vision, resolved).   Respiratory: Negative for cough and shortness of breath.    Cardiovascular: Negative for chest pain.   Gastrointestinal: Negative for abdominal pain, diarrhea, nausea and vomiting.   Genitourinary: Negative for dysuria, frequency and urgency.        Negative for urine color change.    Musculoskeletal: Positive for gait problem. Negative for back pain.   Skin: Negative for rash.   Neurological: Positive for syncope and speech difficulty. Negative for dizziness, facial asymmetry, weakness, numbness and headaches.   Psychiatric/Behavioral: Negative for confusion.       Physical Exam     Initial Vitals [01/09/19 1050]   BP Pulse Resp Temp SpO2   129/84 60 16 97.5 °F (36.4 °C) 98 %      MAP       --         Physical Exam    Nursing note and vitals reviewed.  Constitutional: She appears well-developed and well-nourished. No distress.   HENT:   Head: Normocephalic and atraumatic.   Mouth/Throat: Oropharynx is clear and moist.   Eyes: Conjunctivae and EOM are normal. Pupils are equal, round, and reactive to light.   Neck: Normal range of motion. Neck supple.   Cardiovascular: Normal rate, regular rhythm, normal heart sounds and intact distal pulses.   Pulmonary/Chest: Breath  sounds normal. No respiratory distress. She has no wheezes. She has no rhonchi. She has no rales.   Musculoskeletal: She exhibits no edema.   Neurological: She is alert and oriented to person, place, and time. No sensory deficit. GCS score is 15. GCS eye subscore is 4. GCS verbal subscore is 5. GCS motor subscore is 6.   Equal hand . No pronator drift in upper extremities. 5/5 strength in bilateral upper extremities. Positive slurred speech. Bilateral lower leg weakness, right greater than left. 4/5 strength in bilateral lower extremities. Sensation intact to light touch. Shoulder shrug normal. No tongue deviation.    Skin: Skin is warm and dry.   Psychiatric: She has a normal mood and affect. Her behavior is normal. Judgment and thought content normal.         ED Course   Procedures  Labs Reviewed   CBC W/ AUTO DIFFERENTIAL - Abnormal; Notable for the following components:       Result Value    MCH 31.4 (*)     All other components within normal limits   COMPREHENSIVE METABOLIC PANEL - Abnormal; Notable for the following components:    CO2 22 (*)     All other components within normal limits   LIPID PANEL - Abnormal; Notable for the following components:    Cholesterol 219 (*)     HDL 84 (*)     All other components within normal limits   PROTIME-INR   TSH   URINALYSIS, REFLEX TO URINE CULTURE    Narrative:     Preferred Collection Type->Urine, Clean Catch   POCT GLUCOSE, HAND-HELD DEVICE   POCT GLUCOSE     EKG Readings: (Independently Interpreted)   Initial Reading: No STEMI.   NSR at a rate of 69 with sinus arrhythmia. No ischemic changes.        Imaging Results          MRI Brain W WO Contrast (Final result)  Result time 01/09/19 15:41:03   Procedure changed from MRI Brain Ischemic Inter Pro Incl MRA W/O Con     Final result by Fermin Sykes MD (01/09/19 15:41:03)                 Impression:      Acute lacunar infarct involving the left ventral thalamus.  No hemorrhage or mass effect.  No significant  vascular abnormality.    Remote lacunar infarct involving the right ventral thalamus and suggestion of remote punctate infarcts in the cerebellar hemispheres.  Mild findings of chronic microvascular ischemic disease.      Electronically signed by: Fermin Sykes MD  Date:    01/09/2019  Time:    15:41             Narrative:    EXAMINATION:  MRA BRAIN WITHOUT CONTRAST; MRA NECK WITH CONTRAST; MRI BRAIN W WO CONTRAST    CLINICAL HISTORY:  Stroke;; Focal neuro deficit, new, fixed or worsening, >6 hours;    TECHNIQUE:  Routine MRI brain with and without contrast, noncontrast MRA of the brain, and postcontrast MRA of the neck. Multiplanar multisequence MR imaging of the brain was performed before and after the administration of 10 mL Gadavist intravenous contrast. Noncontrast 3D time-of-flight intracranial MR angiography was performed through the [place] [facility] [place] and postcontrast 3D time-of-flight MR angiography of the neck was performed with MIP reformatting    COMPARISON:  None    FINDINGS:  Overall examination is mildly degraded related to motion artifact.    Intracranial Compartment: Small bandlike focus of diffusion restriction is present in the anterior left thalamus in keeping with acute infarct.  Remote lacunar infarct is present in a corresponding region in the right anterior thalamus.  Punctate foci of T2 hyperintense signal in the bilateral cerebellar hemispheres are suggestive of remote infarcts.  Mild burden of patchy and confluent T2 hyperintense foci scattered in the supratentorial periventricular and subcortical white matter most likely relate to sequelae of chronic microvascular ischemic disease.  No hemorrhage.  No abnormal parenchymal enhancement. Ventricles and sulci are normal in size for age without evidence of hydrocephalus. No extra-axial blood or fluid collections.    Aorta: Normal 3 vessel arch.    Extracranial carotid circulation: No hemodynamically significant stenosis, aneurysmal dilatation, or  dissection.    Extracranial vertebral circulation: No hemodynamically significant stenosis, aneurysmal dilatation, or dissection.    Intracranial Arteries: Motion artifact mildly degrades intracranial MRA.  Artifact is particularly pronounced through the bilateral anterior cerebral arteries and in the mid basilar artery.  Otherwise, no focal high-grade stenosis, occlusion, or aneurysm.  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    Skull/Extracranial Contents (limited evaluation): Bone marrow signal intensity is normal.                               MRA Neck with contrast (Final result)  Result time 01/09/19 15:41:03   Procedure changed from MRA Neck     Final result by Fermin Sykes MD (01/09/19 15:41:03)                 Impression:      Acute lacunar infarct involving the left ventral thalamus.  No hemorrhage or mass effect.  No significant vascular abnormality.    Remote lacunar infarct involving the right ventral thalamus and suggestion of remote punctate infarcts in the cerebellar hemispheres.  Mild findings of chronic microvascular ischemic disease.      Electronically signed by: Fermin Sykes MD  Date:    01/09/2019  Time:    15:41             Narrative:    EXAMINATION:  MRA BRAIN WITHOUT CONTRAST; MRA NECK WITH CONTRAST; MRI BRAIN W WO CONTRAST    CLINICAL HISTORY:  Stroke;; Focal neuro deficit, new, fixed or worsening, >6 hours;    TECHNIQUE:  Routine MRI brain with and without contrast, noncontrast MRA of the brain, and postcontrast MRA of the neck. Multiplanar multisequence MR imaging of the brain was performed before and after the administration of 10 mL Gadavist intravenous contrast. Noncontrast 3D time-of-flight intracranial MR angiography was performed through the [place] [facility] [place] and postcontrast 3D time-of-flight MR angiography  of the neck was performed with MIP reformatting    COMPARISON:  None    FINDINGS:  Overall examination is mildly degraded related to motion artifact.    Intracranial Compartment: Small bandlike focus of diffusion restriction is present in the anterior left thalamus in keeping with acute infarct.  Remote lacunar infarct is present in a corresponding region in the right anterior thalamus.  Punctate foci of T2 hyperintense signal in the bilateral cerebellar hemispheres are suggestive of remote infarcts.  Mild burden of patchy and confluent T2 hyperintense foci scattered in the supratentorial periventricular and subcortical white matter most likely relate to sequelae of chronic microvascular ischemic disease.  No hemorrhage.  No abnormal parenchymal enhancement. Ventricles and sulci are normal in size for age without evidence of hydrocephalus. No extra-axial blood or fluid collections.    Aorta: Normal 3 vessel arch.    Extracranial carotid circulation: No hemodynamically significant stenosis, aneurysmal dilatation, or dissection.    Extracranial vertebral circulation: No hemodynamically significant stenosis, aneurysmal dilatation, or dissection.    Intracranial Arteries: Motion artifact mildly degrades intracranial MRA.  Artifact is particularly pronounced through the bilateral anterior cerebral arteries and in the mid basilar artery.  Otherwise, no focal high-grade stenosis, occlusion, or aneurysm.  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    Skull/Extracranial Contents (limited evaluation): Bone marrow signal intensity is normal.                               MRA Brain (Final result)  Result time 01/09/19 15:41:03    Final result by Fermin Sykes MD (01/09/19 15:41:03)                 Impression:      Acute lacunar infarct involving the left  ventral thalamus.  No hemorrhage or mass effect.  No significant vascular abnormality.    Remote lacunar infarct involving the right ventral thalamus and suggestion of remote punctate infarcts in the cerebellar hemispheres.  Mild findings of chronic microvascular ischemic disease.      Electronically signed by: Fermin Sykes MD  Date:    01/09/2019  Time:    15:41             Narrative:    EXAMINATION:  MRA BRAIN WITHOUT CONTRAST; MRA NECK WITH CONTRAST; MRI BRAIN W WO CONTRAST    CLINICAL HISTORY:  Stroke;; Focal neuro deficit, new, fixed or worsening, >6 hours;    TECHNIQUE:  Routine MRI brain with and without contrast, noncontrast MRA of the brain, and postcontrast MRA of the neck. Multiplanar multisequence MR imaging of the brain was performed before and after the administration of 10 mL Gadavist intravenous contrast. Noncontrast 3D time-of-flight intracranial MR angiography was performed through the [place] [facility] [place] and postcontrast 3D time-of-flight MR angiography of the neck was performed with MIP reformatting    COMPARISON:  None    FINDINGS:  Overall examination is mildly degraded related to motion artifact.    Intracranial Compartment: Small bandlike focus of diffusion restriction is present in the anterior left thalamus in keeping with acute infarct.  Remote lacunar infarct is present in a corresponding region in the right anterior thalamus.  Punctate foci of T2 hyperintense signal in the bilateral cerebellar hemispheres are suggestive of remote infarcts.  Mild burden of patchy and confluent T2 hyperintense foci scattered in the supratentorial periventricular and subcortical white matter most likely relate to sequelae of chronic microvascular ischemic disease.  No hemorrhage.  No abnormal parenchymal enhancement. Ventricles and sulci are normal in size for age without evidence of hydrocephalus. No extra-axial blood or fluid collections.    Aorta: Normal 3 vessel arch.    Extracranial carotid circulation: No  hemodynamically significant stenosis, aneurysmal dilatation, or dissection.    Extracranial vertebral circulation: No hemodynamically significant stenosis, aneurysmal dilatation, or dissection.    Intracranial Arteries: Motion artifact mildly degrades intracranial MRA.  Artifact is particularly pronounced through the bilateral anterior cerebral arteries and in the mid basilar artery.  Otherwise, no focal high-grade stenosis, occlusion, or aneurysm.  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    Skull/Extracranial Contents (limited evaluation): Bone marrow signal intensity is normal.                               X-Ray Chest AP Portable (Final result)  Result time 01/09/19 11:43:14    Final result by Sydnie Rai MD (01/09/19 11:43:14)                 Impression:      No acute finding      Electronically signed by: Sydnie Rai MD  Date:    01/09/2019  Time:    11:43             Narrative:    EXAMINATION:  XR CHEST AP PORTABLE    CLINICAL HISTORY:  Stroke;    TECHNIQUE:  Single frontal view of the chest was performed.    COMPARISON:  None    FINDINGS:  Image quality degraded by portable technique and overlying soft tissues, particularly overlying the lower aspect of the chest.    Cardiac size is not enlarged.  There is calcification along the wall of the aorta.  No increase in pulmonary vascularity is noted.  There is no pleural effusion.  The osseous structures are intact.  Lung fields are clear.                               CT Head Without Contrast (Edited Result - FINAL)  Result time 01/09/19 15:24:36    Addendum 1 of 1 by Fermin Sykes MD (01/09/19 15:24:36)      Bandlike area of hypoattenuation in the left ventral thalamus is in keeping with age-indeterminate infarct.  Remote lacunar infarct is present in the right ventral  thalamus.      Electronically signed by: Fermin Sykes MD  Date:    01/09/2019  Time:    15:24               Final result by Fermin Sykes MD (01/09/19 11:32:47)                 Impression:      No acute abnormality.      Electronically signed by: Fermin Sykes MD  Date:    01/09/2019  Time:    11:32             Narrative:    EXAMINATION:  CT HEAD WITHOUT CONTRAST    CLINICAL HISTORY:  Motor neuron disease;    TECHNIQUE:  Low dose axial CT images obtained throughout the head without intravenous contrast. Sagittal and coronal reconstructions were performed.    COMPARISON:  None.    FINDINGS:  Intracranial compartment: The brain parenchyma appears normal. No parenchymal mass, hemorrhage, edema or major vascular distribution infarct. Ventricles and sulci are normal in size for age without evidence of hydrocephalus. No extra-axial blood or fluid collections.    Skull/extracranial contents (limited evaluation): No fracture. Mastoid air cells and paranasal sinuses are essentially clear.                              X-Rays:   Independently Interpreted Readings:   Chest X-Ray: Normal heart size.  No infiltrates.  No acute abnormalities.   Head CT: No acute intercranial process.      Medical Decision Making:   History:   Old Medical Records: I decided to obtain old medical records.  Old Records Summarized: records from clinic visits.       <> Summary of Records: On review of medical records, patient went to  this morning with dizziness, blurred vision, right-sided weakness, and slurred speech that started last night. Symptoms resolved except for right-sided weakness.   Initial Assessment:   Emergent evaluation of 58 year old female presenting with dizziness, blurred vision, ataxia, and slurred speech since 3 PM yesterday. Stroke code not activated as patient is out of thrombolytic window. Symptoms also improving.   Differential Diagnosis:   CVA, TIA, intracranial hemorrhage  Clinical Tests:   Lab Tests: Ordered and  Reviewed  Radiological Study: Ordered and Reviewed  Medical Tests: Ordered and Reviewed  ED Management:  - IV  - cardiac monitor  - CT head  - labs    11:10 AM Patient sent to CT immediately on arrival. Labs, chest X-ray, and EKG ordered. She appears anxious. Will continue to closely monitor.     12:40 PM Case discussed with Dr. Holland (Hospitalist). Will admit the patient to his service.     12:57 PM Case discussed with Dr. Sommer.             Scribe Attestation:   Scribe #1: I performed the above scribed service and the documentation accurately describes the services I performed. I attest to the accuracy of the note.    Attending Attestation:           Physician Attestation for Scribe:  Physician Attestation Statement for Scribe #1: I, Dr. Muhammad, reviewed documentation, as scribed by Azar South  in my presence, and it is both accurate and complete.     Comments: I, Dr. Jennifer Muhammad, personally performed the services described in this documentation. All medical record entries made by the scribe were at my direction and in my presence.  I have reviewed the chart and agree that the record reflects my personal performance and is accurate and complete. Jennifer Muhammad MD.                   Clinical Impression:     1. Stroke    2. Acute ischemic cerebrovascular accident (CVA) involving left middle cerebral artery territory            Disposition:   Disposition: Admitted  Condition: Jackelin Muhammad MD  01/09/19 2969

## 2019-01-10 VITALS
SYSTOLIC BLOOD PRESSURE: 131 MMHG | TEMPERATURE: 98 F | HEIGHT: 62 IN | DIASTOLIC BLOOD PRESSURE: 82 MMHG | RESPIRATION RATE: 18 BRPM | BODY MASS INDEX: 34.72 KG/M2 | OXYGEN SATURATION: 98 % | WEIGHT: 188.69 LBS | HEART RATE: 63 BPM

## 2019-01-10 PROBLEM — I63.81 ACUTE LACUNAR STROKE: Status: ACTIVE | Noted: 2019-01-09

## 2019-01-10 LAB
ALBUMIN SERPL BCP-MCNC: 3.3 G/DL
ALP SERPL-CCNC: 56 U/L
ALT SERPL W/O P-5'-P-CCNC: 18 U/L
ANION GAP SERPL CALC-SCNC: 9 MMOL/L
AST SERPL-CCNC: 16 U/L
BASOPHILS # BLD AUTO: 0.03 K/UL
BASOPHILS NFR BLD: 0.6 %
BILIRUB SERPL-MCNC: 0.6 MG/DL
BUN SERPL-MCNC: 16 MG/DL
CALCIUM SERPL-MCNC: 9.9 MG/DL
CHLORIDE SERPL-SCNC: 107 MMOL/L
CO2 SERPL-SCNC: 25 MMOL/L
CREAT SERPL-MCNC: 0.8 MG/DL
DIFFERENTIAL METHOD: ABNORMAL
EOSINOPHIL # BLD AUTO: 0.3 K/UL
EOSINOPHIL NFR BLD: 5.2 %
ERYTHROCYTE [DISTWIDTH] IN BLOOD BY AUTOMATED COUNT: 13.6 %
EST. GFR  (AFRICAN AMERICAN): >60 ML/MIN/1.73 M^2
EST. GFR  (NON AFRICAN AMERICAN): >60 ML/MIN/1.73 M^2
GLUCOSE SERPL-MCNC: 87 MG/DL
HCT VFR BLD AUTO: 37.6 %
HGB BLD-MCNC: 12.7 G/DL
INR PPP: 0.9
LYMPHOCYTES # BLD AUTO: 2.1 K/UL
LYMPHOCYTES NFR BLD: 39.9 %
MAGNESIUM SERPL-MCNC: 1.9 MG/DL
MCH RBC QN AUTO: 31.2 PG
MCHC RBC AUTO-ENTMCNC: 33.8 G/DL
MCV RBC AUTO: 92 FL
MONOCYTES # BLD AUTO: 0.4 K/UL
MONOCYTES NFR BLD: 7.3 %
NEUTROPHILS # BLD AUTO: 2.5 K/UL
NEUTROPHILS NFR BLD: 46.8 %
PLATELET # BLD AUTO: 247 K/UL
PMV BLD AUTO: 9.9 FL
POTASSIUM SERPL-SCNC: 3.1 MMOL/L
PROT SERPL-MCNC: 6.1 G/DL
PROTHROMBIN TIME: 10.4 SEC
RBC # BLD AUTO: 4.07 M/UL
SODIUM SERPL-SCNC: 141 MMOL/L
WBC # BLD AUTO: 5.37 K/UL

## 2019-01-10 PROCEDURE — 36415 COLL VENOUS BLD VENIPUNCTURE: CPT

## 2019-01-10 PROCEDURE — 99239 HOSP IP/OBS DSCHRG MGMT >30: CPT | Mod: ,,, | Performed by: HOSPITALIST

## 2019-01-10 PROCEDURE — 92507 TX SP LANG VOICE COMM INDIV: CPT

## 2019-01-10 PROCEDURE — 80053 COMPREHEN METABOLIC PANEL: CPT

## 2019-01-10 PROCEDURE — 97161 PT EVAL LOW COMPLEX 20 MIN: CPT

## 2019-01-10 PROCEDURE — 94761 N-INVAS EAR/PLS OXIMETRY MLT: CPT

## 2019-01-10 PROCEDURE — 99239 PR HOSPITAL DISCHARGE DAY,>30 MIN: ICD-10-PCS | Mod: ,,, | Performed by: HOSPITALIST

## 2019-01-10 PROCEDURE — 85610 PROTHROMBIN TIME: CPT

## 2019-01-10 PROCEDURE — 25000003 PHARM REV CODE 250: Performed by: HOSPITALIST

## 2019-01-10 PROCEDURE — 85025 COMPLETE CBC W/AUTO DIFF WBC: CPT

## 2019-01-10 PROCEDURE — A4216 STERILE WATER/SALINE, 10 ML: HCPCS | Performed by: HOSPITALIST

## 2019-01-10 PROCEDURE — 97165 OT EVAL LOW COMPLEX 30 MIN: CPT

## 2019-01-10 PROCEDURE — 83735 ASSAY OF MAGNESIUM: CPT

## 2019-01-10 RX ORDER — ASPIRIN 81 MG/1
81 TABLET ORAL DAILY
Refills: 0 | COMMUNITY
Start: 2019-01-11 | End: 2020-01-11

## 2019-01-10 RX ORDER — ATORVASTATIN CALCIUM 40 MG/1
40 TABLET, FILM COATED ORAL DAILY
Qty: 30 TABLET | Refills: 1 | Status: SHIPPED | OUTPATIENT
Start: 2019-01-11 | End: 2020-01-11

## 2019-01-10 RX ORDER — IRBESARTAN AND HYDROCHLOROTHIAZIDE 150; 12.5 MG/1; MG/1
1 TABLET, FILM COATED ORAL DAILY
Qty: 30 TABLET | Refills: 0 | Status: SHIPPED | OUTPATIENT
Start: 2019-01-10

## 2019-01-10 RX ORDER — AMLODIPINE BESYLATE 2.5 MG/1
2.5 TABLET ORAL DAILY
Qty: 30 TABLET | Refills: 0 | Status: SHIPPED | OUTPATIENT
Start: 2019-01-10

## 2019-01-10 RX ADMIN — ATORVASTATIN CALCIUM 40 MG: 20 TABLET, FILM COATED ORAL at 08:01

## 2019-01-10 RX ADMIN — ASPIRIN 81 MG: 81 TABLET, COATED ORAL at 08:01

## 2019-01-10 RX ADMIN — Medication 3 ML: at 08:01

## 2019-01-10 NOTE — PT/OT/SLP EVAL
"Physical Therapy Evaluation and Discharge Note    Patient Name:  Seble Puente   MRN:  [id number]    Recommendations:     Discharge Recommendations:  (No PT needs)   Discharge Equipment Recommendations: none   Barriers to discharge: None    Assessment:     Seble Puente is a 58 y.o. female admitted with a medical diagnosis of Acute lacunar stroke. .  At this time, patient is functioning at their prior level of function and does not require further acute PT services.     Noted per chart, "Remote lacunar infarct involving the right ventral thalamus and suggestion of remote punctate infarcts in the cerebellar hemispheres.  Mild findings of chronic microvascular ischemic disease." Despite findings, patient demonstrating good safety with mobility and does not require PT services at this time.     Recent Surgery: * No surgery found *      Plan:     During this hospitalization, patient does not require further acute PT services.  Please re-consult if situation changes.      Subjective     Chief Complaint: Ready to be d/c'ed   Patient/Family Comments/goals: "I really just want to go back to [place]." " I am fine."  Pain/Comfort:  · Pain Rating 1: 5/10  · Location - Side 1: Bilateral  · Location - Orientation 1: generalized  · Location 1: head  · Pain Rating Post-Intervention 1: 5/10    Patients cultural, spiritual, Taoist conflicts given the current situation: no    Living Environment:  · Lives in [facility] with no steps to enter   · Lives with wife in [place]  · In town for art show in [place], passing through [place] when incident happened.   · Previously independently with all ADLs and IADLS  · Drove for 4 days for road trip to [place]   · Equipment used at home: none.   · Upon discharge, patient will have assistance from wife.    Objective:     Communicated with RN prior to session.  Patient found sitting in chair upon PT entry to room found with: peripheral IV     General Precautions: Standard, " aspiration   Orthopedic Precautions:N/A   Braces: N/A     · Cognition:   · Patient is oriented to name, , location, and situation.  · Pt follows approximately 100 % of multi-step commands.    · Mood: Pleasant and cooperative.   · Musculoskeletal:  · Posture:    · -       Rounded shoulders  · -       Forward head  · -       Kyphosis  · LE ROM/Strength:   · 4/5 BLE hip flex  · 5/5 BLE knee flex/ext  · 5/5 BLE ankle DF  · Neuromuscular:  · Sensation: Intact to light touch bilateral LEs. Denied paresthesias.  · Coordination/Tone/Reflexes: No impairments identified with functional mobility. No formal testing performed.   · Balance: Mod I with standing and sitting   · Visual-vestibular: No impairments identified with functional mobility. No formal testing performed.  · Integument: Visible skin intact  · Cardiopulmonary:  · Vital signs:   · BP: 110/73  · HR: 67    Functional Mobility:  · Transfers:     · Sit to Stand:  modified independence with no AD  · Gait:   · 120' with SBA  · Average lida  · Symmetrical step length bilaterally   · Minimal deviation of path to R side  · Good foot clearance and heel strike noted bilaterally  · Balance:   · Standing: Mod I    AM-PAC 6 CLICK MOBILITY  Total Score:23       Therapeutic Activities and Exercises:  · Deferring question about driving to [place]    AM-[facility] 6 CLICK MOBILITY  Total Score:23     Patient left up in chair with call button in reach and RN notified.    GOALS:   Multidisciplinary Problems     Physical Therapy Goals        Problem: Physical Therapy Goal    Goal Priority Disciplines Outcome Goal Variances Interventions   Physical Therapy Goal     PT, PT/OT      Description:   Patient is functioning at their prior level of function and does not require further acute PT services.                       History:     Past Medical History:   Diagnosis Date    Hypertension        Past Surgical History:   Procedure Laterality Date    HYSTERECTOMY      TONSILLECTOMY          Clinical Decision Making:     History  Co-morbidities and personal factors that may impact the plan of care Examination  Body Structures and Functions, activity limitations and participation restrictions that may impact the plan of care Clinical Presentation   Decision Making/ Complexity Score   Co-morbidities:   [] Time since onset of injury / illness / exacerbation  [] Status of current condition  []Patient's cognitive status and safety concerns    [] Multiple Medical Problems (see med hx)  Personal Factors:   [] Patient's age  [] Prior Level of function   [] Patient's home situation (environment and family support)  [] Patient's level of motivation  [] Expected progression of patient      HISTORY:(criteria)    [] 16279 - no personal factors/history    [] 88678 - has 1-2 personal factor/comorbidity     [] 31110 - has >3 personal factor/comorbidity     Body Regions:  [] Objective examination findings  [] Head     []  Neck  [] Trunk   [] Upper Extremity  [] Lower Extremity    Body Systems:  [] For communication ability, affect, cognition, language, and learning style: the assessment of the ability to make needs known, consciousness, orientation (person, place, and time), expected emotional /behavioral responses, and learning preferences (eg, learning barriers, education  needs)  [] For the neuromuscular system: a general assessment of gross coordinated movement (eg, balance, gait, locomotion, transfers, and transitions) and motor function  (motor control and motor learning)  [] For the musculoskeletal system: the assessment of gross symmetry, gross range of motion, gross strength, height, and weight  [] For the integumentary system: the assessment of pliability(texture), presence of scar formation, skin color, and skin integrity  [] For cardiovascular/pulmonary system: the assessment of heart rate, respiratory rate, blood pressure, and edema     Activity limitations:    [] Patient's cognitive status and saf  ety concerns          [] Status of current condition      [] Weight bearing restriction  [] Cardiopulmunary Restriction    Participation Restrictions:   [] Goals and goal agreement with the patient     [] Rehab potential (prognosis) and probable outcome      Examination of Body System: (criteria)    [] 44942 - addressing 1-2 elements    [] 28646 - addressing a total of 3 or more elements     [] 03565 -  Addressing a total of 4 or more elements         Clinical Presentation: (criteria)  Choose one     On examination of body system using standardized tests and measures patient presents with (CHOOSE ONE) elements from any of the following: body structures and functions, activity limitations, and/or participation restrictions.  Leading to a clinical presentation that is considered (CHOOSE ONE)                              Clinical Decision Making  (Eval Complexity):  Choose One     Time Tracking:     PT Received On: 01/10/19  PT Start Time: 0854     PT Stop Time: 0906  PT Total Time (min): 12 min     Billable Minutes: Evaluation 12      Camille Ambrose, PT  01/10/2019

## 2019-01-10 NOTE — PLAN OF CARE
Problem: Occupational Therapy Goal  Goal: Occupational Therapy Goal  Outcome: Outcome(s) achieved Date Met: 01/10/19  OT evaluation completed. Pt functioning at Mod (I) level for ADLs and transfers, SBA for ambulation due to minimal balance concerns. Pt has no acute OT goals and no further OT needs. Pt to be discharged from OT services.

## 2019-01-10 NOTE — PLAN OF CARE
Patient discharged before being seen by CM team. Patient has no CM needs reviewed from chart. PT and OT recommendations were none. Patient is on a road trip to [place].     LMSW attempted to contact the patient and her spouse via telephone. Both numbers listed on the faec sheet are incorrect.        01/10/19 1202   Final Note   Assessment Type Final Discharge Note   Anticipated Discharge Disposition LWBS   What phone number can be called within the next 1-3 days to see how you are doing after discharge? [phone]

## 2019-01-10 NOTE — PLAN OF CARE
Problem: Adult Inpatient Plan of Care  Goal: Plan of Care Review  Outcome: Outcome(s) achieved Date Met: 01/10/19  POC reviewed with pt, no significant events this shift, Pt had no c/o pain, Pt is able to ambulate to bathroom without assistance. Bed in low position, call bell in reach

## 2019-01-10 NOTE — HOSPITAL COURSE
Mrs. Puente is a 58 year old woman with history of HTN and TIA who was admitted for evaluation of double vision, balance difficulty and slurred speech.  She was diagnosed with acute left lacunar infarct.  She does not have diabetes.  Her LDL is above goal at 114.  She had no carotid stenosis or intracerebral vascular stenosis or aneurysm on imaging.  MRI of brain did reveal this acute lacunar stroke as well as an older right sided stroke.  Echo was obtained and was unremarkable but for grade I diastolic dysfunction.  She was seen by Dr. Sommer with neurology as well as SLP, PT and OT.  She will continue on aspirin and statin as well as her home anti-hypertensive medications.  She is intructed not to drive until cleared by her neurologist back in [place].  Her symptoms have greatly improved and she is safe for discharge today.

## 2019-01-10 NOTE — PT/OT/SLP EVAL
Occupational Therapy   Evaluation and Discharge Note    Name: Seble Puente  MRN: [id number]  Admitting Diagnosis:  Acute lacunar stroke      Recommendations:     Discharge Recommendations:  none  Discharge Equipment Recommendations:  none  Barriers to discharge:  None    Assessment:     Seble Puente is a 58 y.o. female with a medical diagnosis of Acute lacunar stroke. At this time, patient is functioning at their prior level of function and does not require further acute OT services.     Plan:     During this hospitalization, patient does not require further acute OT services.  Please re-consult if situation changes.    · Plan of Care Reviewed with: patient    Subjective     Chief Complaint: Pt anxious to discharge from hospital. Pt from [place] and planning to drive back once discharged from hospital.  Patient/Family Comments/goals: to go home and return to work    Occupational Profile:  Living Environment: Pt lives with wife in [facility] with no steps.   Previous level of function: independent  Roles and Routines: full time employment, drives  Equipment Used at home:  none  Assistance upon Discharge: wife to assist as needed    Pain/Comfort:  · Pain Rating 1: 5/10  · Location 1: pt c/o headache  · Pain Addressed 1: Distraction  · Pain Rating Post-Intervention 1: 5/10    Patients cultural, spiritual, Yarsani conflicts given the current situation: no    Objective:     Communicated with: RN prior to session.  Patient found up in chair upon OT entry to room.    General Precautions: Standard,     Orthopedic Precautions:N/A   Braces: N/A     Occupational Performance:    Bed Mobility:    · Not performed    Functional Mobility/Transfers:  · Patient completed Sit <> Stand Transfer with modified independence  with  no assistive device   · Functional Mobility: Pt ambulated in room and hallway with SBA for balance/safety.    Activities of Daily Living:  · Pt showered and dressed prior to OT evaluation. Pt using  "restroom independently.    Cognitive/Visual Perceptual:  Cognitive/Psychosocial Skills:  -       Oriented to: Person, Place, Time and Situation   -       Follows Commands/attention:Follows multistep  commands  -       Communication: clear/fluent  -       Memory: No Deficits noted  -       Safety awareness/insight to disability: intact   -       Mood/Affect/Coping skills/emotional control: Anxious  Visual/Perceptual:      -Intact     Physical Exam:  Balance:    -       Good  Postural examination/scapula alignment:    -       No postural abnormalities identified  Skin integrity: Visible skin intact  Edema:  None noted  Sensation:    -       Intact  Upper Extremity Range of Motion:     -       Right Upper Extremity: WFL  -       Left Upper Extremity: WFL  Upper Extremity Strength:    -       Right Upper Extremity: WFL  -       Left Upper Extremity: WFL   Strength:    -       Right Upper Extremity: WFL  -       Left Upper Extremity: WFL  Fine Motor Coordination:    -       Intact  Gross motor coordination:   WFL    AMPAC 6 Click ADL:  AMPAC Total Score: 24    Treatment & Education:  Pt educated on role of OT and POC, safety awareness re: discharge and d/c planning.   Education:    Patient left up in chair.        History:     Past Medical History:   Diagnosis Date    Hypertension        Past Surgical History:   Procedure Laterality Date    HYSTERECTOMY      TONSILLECTOMY         Clinical Decision Makin.  OT Low:  "Pt evaluation falls under low complexity for evaluation coding due to performance deficits noted in 1-3 areas as stated above and 0 co-morbities affecting current functional status. Data obtained from problem focused assessments. No modifications or assistance was required for completion of evaluation. Only brief occupational profile and history review completed."     Time Tracking:     OT Date of Treatment: 01/10/19  OT Start Time: 854  OT Stop Time: 906  OT Total Time (min): 12 " min    Billable Minutes:Evaluation 12    BORIS Mitchell  1/10/2019

## 2019-01-10 NOTE — NURSING
Received pt from THOMAS Adkins in ED. Pt expressed feelings of not wanting to stay the night and wanting to leave. Dr. Holland notified and pt agreed to stay the night but wants to leave in the morning. Pt VSS on room air. Respirations even and unlabored. No complaints of pain. IV flushed and saline locked. Ambulates in room without assistance. Refusing SCD/TEDs. Telemetry initiated.  Bed low and locked, call light within reach, side rails up x2. Will continue to monitor.

## 2019-01-10 NOTE — PLAN OF CARE
Problem: SLP Goal  Goal: SLP Goal  1. Pt will be able to consume a regular diet with thin liquids with no overt s/s of airway threat or aspiration.   2. Further evaluation of cognitive communication status   Outcome: Ongoing (interventions implemented as appropriate)  Cognitive communication evaluation completed this date. Documentation completed following pt d/c.     Comments: Speech therapy recommended via outpatient services. D/c recommendations discussed with pt and spouse. Documentation completed following pt d/c.

## 2019-01-10 NOTE — PLAN OF CARE
Problem: Adult Inpatient Plan of Care  Goal: Plan of Care Review  Outcome: Ongoing (interventions implemented as appropriate)  No change in respiratory status, will continue to monitor.

## 2019-01-10 NOTE — PROGRESS NOTES
Discharge papers and instructions given. All questions answered and patient and spouse verbalized understanding. IV and tele monitor removed without complication. Prescribed medication delivered by pharmacy. Patient left under own power accompanied by her wife.

## 2019-01-10 NOTE — DISCHARGE SUMMARY
[facility] Hospital Medicine  Discharge Summary      Patient Name: Seble Puente  MRN: [id number]  Admission Date: 1/9/2019  Hospital Length of Stay: 1 days  Discharge Date and Time:  01/10/2019 9:39 AM  Attending Physician: Lewis Holland MD   Discharging Provider: Lewis Holland MD  Primary Care Provider: Primary Doctor No      HPI:   Mrs. Puente is a 58 year old woman with history of HTN and TIA who came in for evaluation of double vision, slurred speech and leaning towards the right.  She states that she and her wife have been on a long road trip from [place], to [place] and are now on their way back to [place].  Yesterday they stopped and ate fish from a fast food restaurant.  Shortly after this, around 3:00PM yesterday, she developed acute onset of severe double vision with slurred speech.  Additionally at that time she was leaning to the right and had difficulty with balance.  Her wife tried to convince her to go the the hospital at that time but she refused.  Overnight her symptoms have improved significantly, with near resolution of her diplopia and balance difficulties, but her speech is still noticeably slurred.  She denies any chest pain, shortness of breath, palpitations, leg swelling, pain, numbness, tingling or headache.  She is very anxious and tearful a times and states she is very scared of needing to spend a night in the hospital.      * No surgery found *      Hospital Course:   Mrs. Puente is a 58 year old woman with history of HTN and TIA who was admitted for evaluation of double vision, balance difficulty and slurred speech.  She was diagnosed with acute left lacunar infarct.  She does not have diabetes.  Her LDL is above goal at 114.  She had no carotid stenosis or intracerebral vascular stenosis or aneurysm on imaging.  MRI of brain did reveal this acute lacunar stroke as well as an older right sided stroke.  Echo was obtained and was unremarkable but for  grade I diastolic dysfunction.  She was seen by Dr. Sommer with neurology as well as SLP, PT and OT.  She will continue on aspirin and statin as well as her home anti-hypertensive medications.  She is intructed not to drive until cleared by her neurologist back in [place].  Her symptoms have greatly improved and she is safe for discharge today.     Consults:   Consults (From admission, onward)        Status Ordering Provider     Inpatient consult to neurology  Once     Provider:  Sabas Sommer MD    Acknowledged NAIDA BURGOS     IP consult to case management/social work  Once     Provider:  (Not yet assigned)    Acknowledged KENDRICK SALVADOR          No new Assessment & Plan notes have been filed under this hospital service since the last note was generated.  Service: Hospital Medicine    Final Active Diagnoses:    Diagnosis Date Noted POA    PRINCIPAL PROBLEM:  Acute lacunar stroke [I63.9] 01/09/2019 Yes    Essential hypertension [I10] 01/09/2019 Yes      Problems Resolved During this Admission:       Discharged Condition: fair    Disposition: Home or Self Care    Follow Up:  Follow-up Information     Primary Doctor No In 1 week.           Neurologist In 2 weeks.               Patient Instructions:      Diet Cardiac     Notify your health care provider if you experience any of the following:  increased confusion or weakness     Notify your health care provider if you experience any of the following:  persistent dizziness, light-headedness, or visual disturbances     Notify your health care provider if you experience any of the following:  worsening rash     Notify your health care provider if you experience any of the following:  severe persistent headache     Notify your health care provider if you experience any of the following:  difficulty breathing or increased cough     Notify your health care provider if you experience any of the following:  severe uncontrolled pain     Notify your health care  provider if you experience any of the following:  persistent nausea and vomiting or diarrhea     Notify your health care provider if you experience any of the following:  temperature >100.4     Activity as tolerated   Order Comments: NO DRIVING FOR 1 MONTH UNTIL CLEARED BY NEUROLOGIST       Significant Diagnostic Studies: Labs:   BMP:   Recent Labs   Lab 01/09/19  1113 01/10/19  0441    87    141   K 3.9 3.1*    107   CO2 22* 25   BUN 11 16   CREATININE 0.8 0.8   CALCIUM 10.5 9.9   MG  --  1.9   , CMP   Recent Labs   Lab 01/09/19  1113 01/10/19  0441    141   K 3.9 3.1*    107   CO2 22* 25    87   BUN 11 16   CREATININE 0.8 0.8   CALCIUM 10.5 9.9   PROT 6.9 6.1   ALBUMIN 3.9 3.3*   BILITOT 0.6 0.6   ALKPHOS 67 56   AST 21 16   ALT 18 18   ANIONGAP 10 9   ESTGFRAFRICA >60 >60   EGFRNONAA >60 >60   , CBC   Recent Labs   Lab 01/09/19  1113 01/10/19  0441   WBC 5.88 5.37   HGB 13.2 12.7   HCT 38.2 37.6    247   , Lipid Panel   Lab Results   Component Value Date    CHOL 219 (H) 01/09/2019    HDL 84 (H) 01/09/2019    LDLCALC 114.6 01/09/2019    TRIG 102 01/09/2019    CHOLHDL 38.4 01/09/2019    and A1C:   Recent Labs   Lab 01/09/19  1735   HGBA1C 5.5       Pending Diagnostic Studies:     Procedure Component Value Units Date/Time    Drugs of Abuse Screen, Blood [[id number]] Collected:  01/09/19 1735    Order Status:  Sent Lab Status:  In process Updated:  01/09/19 2117    Specimen:  Blood          Medications:  Reconciled Home Medications:      Medication List      START taking these medications    aspirin 81 MG EC tablet  Commonly known as:  ECOTRIN  Take 1 tablet (81 mg total) by mouth once daily.  Start taking on:  1/11/2019     atorvastatin 40 MG tablet  Commonly known as:  LIPITOR  Take 1 tablet (40 mg total) by mouth once daily.  Start taking on:  1/11/2019        CHANGE how you take these medications    amLODIPine 2.5 MG tablet  Commonly known as:  NORVASC  Take 1 tablet  (2.5 mg total) by mouth once daily.  What changed:    · how much to take  · how to take this  · when to take this     irbesartan-hydrochlorothiazide 150-12.5 mg per tablet  Commonly known as:  AVALIDE  Take 1 tablet by mouth once daily.  What changed:    · how much to take  · how to take this  · when to take this        STOP taking these medications    hydroCHLOROthiazide 25 MG tablet  Commonly known as:  HYDRODIURIL        ASK your doctor about these medications    irbesartan 300 MG tablet  Commonly known as:  AVAPRO  Take 300 mg by mouth once daily.            Indwelling Lines/Drains at time of discharge:   Lines/Drains/Airways          None          Time spent on the discharge of patient: 35 minutes  Patient was seen and examined on the date of discharge and determined to be suitable for discharge.         Lewis Holland MD  Department of Hospital Medicine  [facility]

## 2019-01-10 NOTE — PT/OT/SLP PROGRESS
"Speech Language Pathology Treatment    Patient Name:  Seble Puente   MRN:  [id number]  Admitting Diagnosis: Acute lacunar stroke    Recommendations:                 General Recommendations: SLP to continue to follow for ongoing assessment of diet tolerance, cognitive communication status, and dysarthria s/p CVA    Diet recommendations:  Regular,   thin    Aspiration Precautions: 1 bite/sip at a time, Avoid talking while eating, Double swallow with each bite/sip, Feed only when awake/alert, HOB to 90 degrees, Remain upright 30 minutes post meal and Small bites/sips, no straws     General Precautions: Standard, aspiration    Subjective     Pt awake, alert, agreeable to services. Preparing for d/c and reporting significant anxiety. Pt reports she and spouse have to complete a road trip back to [place], and she is eager to leave.     Objective:     Has the patient been evaluated by SLP for swallowing?   Yes  Keep patient NPO? No   Current Respiratory Status: room air      Cognitive Communication: Pt visibly anxious, but agreeable to follow up session to assess cognitive communication status. Oriented to name, , location, reason for hospitalization, month, and year with 100% acc. States that she "never knows the exact date." Pt is able to answer simple to complex yes/no questions and follow multistep commands with 100% acc. Able to participate in higher level conversation regarding reason for hospitalization and PMH. Pt reports no changes in language or cognition s/p CVA. No overt language deficits noted during this session. Verbal expression is fluent, and pt is able to express wants and needs in organized sentences. No overt anomia or word-finding difficulties noted. Pt able to identify 5/5 items in confrontational naming task with 100% acc. Pt is able to read large block letters with 100% acc given no cues to scan left to right and then down the page. Pt notes changes in memory, reportedly over the last 2-3 " "years. Pt noted MRI results indicating remote infarcts, and questioned if changes in memory were related. Pt states "I will read a book, and not remember anything from it." Pt is able to immediately recall up to 5 digits forward (norm=7) and up to 3 digits backwards (norm=4). Dcr storage and retrieval of novel information noted. 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 Cites some concern that memory changes will negatively impact her professional responsibilities. SLP and pt discussed option of pursuing outpatient speech therapy services once she returned home, to address changes in memory. Pt and spouse agreeable and demonstrated verbal understanding of all discussed.     Motor Speech: Face is symmetrical at rest and during smile and functional movement. Speech is approx % intelligible at the conversational level. Pt reports that Turkmen is her first language; however, she completed a Master's degree in an English speaking program and is a fluent English speaker. Pt reports dcr rate of speech s/p CVA and mild slurring of words. Reports severe slurring on day of admission, but such significant slurring has resolved. States that speech has not returned to baseline; however, is not negatively affecting verbal communication. AMRs appear WNL, and infrequent instances of articulatory imprecision noted.     Swallowing: Pt reporting that she was able to tolerate regular diet and thin liquids without difficulty. Baseline cough noted prior to oral intake. Pt able to consume 4/4 sips of thin liquid via cup rim without overt s/s of aspiration or airway threat: no coughing, throat clearing, vocal quality change. SLP discussed possible s/s of dysphagia s/p CVA and reviewed standard aspiration precautions. Pt stated verbal understanding.     Assessment:     Seble Puente is a 58 y.o. female diagnosed with right lacunar and cerebellar infarcts. At this time, pt is able to consume a regular diet with thin " liquids without overt s/s of aspiration or airway threat. She presents with no overt language deficits; however, she reports changes in memory over the last 2-3 years. SLP discussed continuation of services via outpatient speech therapy. Pt agreeable to discuss options with PCP at next follow up.       Goals:   Multidisciplinary Problems     SLP Goals        Problem: SLP Goal    Goal Priority Disciplines Outcome   SLP Goal     SLP Ongoing (interventions implemented as appropriate)   Description:  1. Pt will be able to consume a regular diet with thin liquids with no overt s/s of airway threat or aspiration.   2. Further evaluation of cognitive communication status                    Plan:     · Patient to be seen:  4 x/week, 6 x/week   · Plan of Care expires:  01/18/19  · Plan of Care reviewed with:  patient, spouse, other (see comments)(RN)   · SLP Follow-Up:  No       Discharge recommendations:  other (see comments)(Outpatient Speech Therapy)     Time Tracking:     SLP Treatment Date:   01/10/19  Speech Start Time:  1013  Speech Stop Time:  1031     Speech Total Time (min):  18 min    Billable Minutes: Speech Therapy Individual 18 minutes    Evaristo Fitzpatrick CCC-SLP  01/10/2019

## 2019-01-11 LAB
AMPHETAMINES SERPL QL: NEGATIVE
BARBITURATES SERPL QL SCN: NEGATIVE
BENZODIAZ SERPL QL SCN: NEGATIVE
BZE SERPL QL: NEGATIVE
CARBOXYTHC SERPL QL SCN: NEGATIVE
ETHANOL SERPL QL SCN: NEGATIVE
METHADONE SERPL QL SCN: NEGATIVE
OPIATES SERPL QL SCN: NEGATIVE
PCP SERPL QL SCN: NEGATIVE
PROPOXYPH SERPL QL: NEGATIVE

## 2020-02-19 ENCOUNTER — HOSPITAL ENCOUNTER (EMERGENCY)
Dept: HOSPITAL 4 - SED | Age: 60
Discharge: HOME | End: 2020-02-19
Payer: COMMERCIAL

## 2020-02-19 VITALS — SYSTOLIC BLOOD PRESSURE: 104 MMHG | WEIGHT: 168 LBS | BODY MASS INDEX: 30.91 KG/M2 | HEIGHT: 62 IN

## 2020-02-19 VITALS — SYSTOLIC BLOOD PRESSURE: 104 MMHG

## 2020-02-19 DIAGNOSIS — R05: Primary | ICD-10-CM

## 2020-02-19 DIAGNOSIS — J45.909: ICD-10-CM

## 2024-02-11 ENCOUNTER — HOSPITAL ENCOUNTER (EMERGENCY)
Dept: HOSPITAL 4 - SED | Age: 64
Discharge: HOME | End: 2024-02-11
Payer: COMMERCIAL

## 2024-02-11 VITALS
SYSTOLIC BLOOD PRESSURE: 133 MMHG | TEMPERATURE: 98.2 F | HEART RATE: 80 BPM | RESPIRATION RATE: 18 BRPM | OXYGEN SATURATION: 98 %

## 2024-02-11 VITALS — WEIGHT: 174 LBS | HEIGHT: 63 IN | BODY MASS INDEX: 30.83 KG/M2

## 2024-02-11 VITALS
TEMPERATURE: 98.3 F | HEART RATE: 83 BPM | SYSTOLIC BLOOD PRESSURE: 136 MMHG | OXYGEN SATURATION: 96 % | RESPIRATION RATE: 19 BRPM

## 2024-02-11 DIAGNOSIS — R09.81: ICD-10-CM

## 2024-02-11 DIAGNOSIS — R05.9: ICD-10-CM

## 2024-02-11 DIAGNOSIS — J45.901: Primary | ICD-10-CM

## 2024-02-11 DIAGNOSIS — Z20.822: ICD-10-CM

## 2024-02-11 DIAGNOSIS — Z79.899: ICD-10-CM

## 2024-02-11 LAB — FLUBV AG UPPER RESP QL IA.RAPID: NEGATIVE

## 2024-02-11 RX ADMIN — IPRATROPIUM BROMIDE AND ALBUTEROL SULFATE ONE ML: .5; 3 SOLUTION RESPIRATORY (INHALATION) at 17:58

## 2024-06-17 ENCOUNTER — OFFICE (OUTPATIENT)
Dept: URBAN - METROPOLITAN AREA CLINIC 73 | Facility: CLINIC | Age: 64
End: 2024-06-17

## 2024-06-17 VITALS
HEIGHT: 66 IN | TEMPERATURE: 98.1 F | DIASTOLIC BLOOD PRESSURE: 83 MMHG | SYSTOLIC BLOOD PRESSURE: 127 MMHG | WEIGHT: 171 LBS | HEART RATE: 65 BPM

## 2024-06-17 DIAGNOSIS — R19.8 ALTERNATING CONSTIPATION AND DIARRHEA: ICD-10-CM

## 2024-06-17 DIAGNOSIS — R10.30 LOWER ABDOMINAL PAIN: ICD-10-CM

## 2024-06-17 PROCEDURE — 99204 OFFICE O/P NEW MOD 45 MIN: CPT | Performed by: INTERNAL MEDICINE

## 2024-06-17 NOTE — SERVICEHPINOTES
This is a 63yoF with a history of TIA/CVA with no significant residual deficits on daily ASA, HTN, HLD, previous smoker, history of L breast cancer s/p lumpectomy procedure, who presents today for chronic symptoms of intermittent constipation, diarrhea, abdominal pain.
analia helms The patient describes a longstanding history (for about 2-3 years) of alternating constipation, diarrhea. Constipation lasts 1-2 days, typically with hard stools, difficulty with evacuating, straining, and interspersed with several days of watery, non-bloody bowel movements with urgency, up to 5-6x daily. This is also associated with bilateral lower abdominal discomfort, that is relieved after a bowel movement. 
analia helms She has been treating this by modifying her diet, she is gluten- free, she is trying to eat more fresh fruits and vegetables. 
analia Adames note, she recently traveled to [email or web address] and upon her return noted a slight increase in diarrheal symptoms from baseline. She was swimming in a stream.